# Patient Record
Sex: MALE | Race: WHITE | NOT HISPANIC OR LATINO | Employment: FULL TIME | URBAN - METROPOLITAN AREA
[De-identification: names, ages, dates, MRNs, and addresses within clinical notes are randomized per-mention and may not be internally consistent; named-entity substitution may affect disease eponyms.]

---

## 2019-12-12 ENCOUNTER — TELEPHONE (OUTPATIENT)
Dept: NEUROSURGERY | Facility: CLINIC | Age: 29
End: 2019-12-12

## 2020-02-19 ENCOUNTER — OFFICE VISIT (OUTPATIENT)
Dept: INTERNAL MEDICINE | Facility: CLINIC | Age: 30
End: 2020-02-19
Payer: COMMERCIAL

## 2020-02-19 VITALS
WEIGHT: 154.75 LBS | SYSTOLIC BLOOD PRESSURE: 107 MMHG | BODY MASS INDEX: 23.45 KG/M2 | HEART RATE: 83 BPM | HEIGHT: 68 IN | DIASTOLIC BLOOD PRESSURE: 67 MMHG | OXYGEN SATURATION: 100 %

## 2020-02-19 DIAGNOSIS — Z13.6 ENCOUNTER FOR LIPID SCREENING FOR CARDIOVASCULAR DISEASE: ICD-10-CM

## 2020-02-19 DIAGNOSIS — D22.9 MULTIPLE NEVI: ICD-10-CM

## 2020-02-19 DIAGNOSIS — Z13.220 ENCOUNTER FOR LIPID SCREENING FOR CARDIOVASCULAR DISEASE: ICD-10-CM

## 2020-02-19 DIAGNOSIS — K58.0 IRRITABLE BOWEL SYNDROME WITH DIARRHEA: ICD-10-CM

## 2020-02-19 DIAGNOSIS — Z23 FLU VACCINE NEED: ICD-10-CM

## 2020-02-19 DIAGNOSIS — Z11.4 SCREENING FOR HIV (HUMAN IMMUNODEFICIENCY VIRUS): ICD-10-CM

## 2020-02-19 DIAGNOSIS — Z13.1 ENCOUNTER FOR SCREENING FOR DIABETES MELLITUS: ICD-10-CM

## 2020-02-19 DIAGNOSIS — L82.1 SEBORRHEIC KERATOSES: ICD-10-CM

## 2020-02-19 DIAGNOSIS — Z00.00 ANNUAL PHYSICAL EXAM: Primary | ICD-10-CM

## 2020-02-19 DIAGNOSIS — G40.909 NONINTRACTABLE EPILEPSY WITHOUT STATUS EPILEPTICUS, UNSPECIFIED EPILEPSY TYPE: ICD-10-CM

## 2020-02-19 DIAGNOSIS — F41.9 ANXIETY: ICD-10-CM

## 2020-02-19 PROCEDURE — 99999 PR PBB SHADOW E&M-EST. PATIENT-LVL IV: ICD-10-PCS | Mod: PBBFAC,,, | Performed by: INTERNAL MEDICINE

## 2020-02-19 PROCEDURE — 99385 PR PREVENTIVE VISIT,NEW,18-39: ICD-10-PCS | Mod: S$GLB,,, | Performed by: INTERNAL MEDICINE

## 2020-02-19 PROCEDURE — 99999 PR PBB SHADOW E&M-EST. PATIENT-LVL IV: CPT | Mod: PBBFAC,,, | Performed by: INTERNAL MEDICINE

## 2020-02-19 PROCEDURE — 99385 PREV VISIT NEW AGE 18-39: CPT | Mod: S$GLB,,, | Performed by: INTERNAL MEDICINE

## 2020-02-19 RX ORDER — LEVETIRACETAM 500 MG/1
TABLET ORAL
COMMUNITY
Start: 2014-05-08 | End: 2020-02-19 | Stop reason: SDUPTHER

## 2020-02-19 RX ORDER — LEVETIRACETAM 500 MG/1
TABLET ORAL
Qty: 180 TABLET | Refills: 3 | Status: SHIPPED | OUTPATIENT
Start: 2020-02-19 | End: 2020-11-16

## 2020-02-19 NOTE — PROGRESS NOTES
Subjective:       Patient ID: Travis Bennett is a 29 y.o. male who  has a past medical history of Epilepsy.    Chief Complaint: Establish Care and Anxiety     History was obtained from the patient and supplemented through chart review.  There were no prior records to review.  -We are requesting records from [place] for vaccines, continuity of care.    Moved here from [place].  Grad school at [employer].  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    HPI    Epilepsy:  +FHx.  Since college.  Occurred during exam.  Tonic clonic.  Resulted in left shoulder dislocation.  Was unable to taper off AEDs d/t recurrence.  Last seizure 8/2017 after missing doses. Is on Keppra 500 qAM, 1000 QHS.  Feels a little foggy in the morning, minor memory issues (forgets words).    Anxiety:  Social anxiety.  Some trouble falling asleep.  Feels anxious/nervous.  Has difficulty being able to control worrying.  Reports racing thoughts, excessive worrying, overthinks it.  Has trouble relaxing; exercise helps.  Is fidgety, nail biting.  Is not easily annoyed.  Not Feels that something bad might happen.  Symptoms occur a few days a week.  Comes in waves.  Stress related.      IBS with diarrhea:  Upset stomach.  Since middle school. Related to anxiety.  No change with cutting out dairy, grain.    Multiple Nevi, seborrheic keratoses:  Has 1 cm SK at left flank.  Saw Derm and was reassured. No FHx skin cancer of first-degree relatives.  No h/o being easily sunburned. >20 nevi.    Exercise:  Strength training 4/week.  2-3/week here.  Walking more.  Goes to [employer].    Diet:  Cooks and buys packaged foods, eats out more than he should.      ETOH: 1-2/day. Not much during the week.    Review of Systems   Constitutional: Negative for activity change and unexpected weight change.   HENT: Negative for hearing loss, rhinorrhea and trouble swallowing.    Eyes: Negative for discharge and visual disturbance.   Respiratory: Negative for chest tightness and  wheezing.    Cardiovascular: Negative for chest pain and palpitations.   Gastrointestinal: Negative for blood in stool, constipation, diarrhea and vomiting.   Endocrine: Negative for polydipsia and polyuria.   Genitourinary: Negative for difficulty urinating, hematuria and urgency.   Musculoskeletal: Negative for arthralgias, joint swelling and neck pain.   Skin: Negative for rash and wound.   Neurological: Negative for weakness and headaches.   Hematological: Negative for adenopathy.   Psychiatric/Behavioral: Negative for confusion and dysphoric mood. The patient is nervous/anxious.          Past Medical History:   Diagnosis Date    Epilepsy      Past Surgical History:   Procedure Laterality Date    SHOULDER SURGERY Left 2012    d/t seizures     Family History   Problem Relation Age of Onset    Alcohol abuse Maternal Grandfather     Drug abuse Maternal Grandfather     Heart attack Maternal Grandfather          of heart attack at 58    Asthma Mother     Breast cancer Mother     Seizures Mother     Heart disease Paternal Grandfather         Obese, had heart attacks    Anxiety disorder Father     Colon cancer Neg Hx      Social History     Socioeconomic History    Marital status:      Spouse name: Not on file    Number of children: Not on file    Years of education: Not on file    Highest education level: Not on file   Occupational History    Not on file   Social Needs    Financial resource strain: Not very hard    Food insecurity:     Worry: Never true     Inability: Never true    Transportation needs:     Medical: No     Non-medical: No   Tobacco Use    Smoking status: Former Smoker     Packs/day: 0.25     Years: 0.00     Pack years: 0.00     Types: Cigarettes     Last attempt to quit: 2016     Years since quittin.1    Tobacco comment: Did smoke some in college but haven't in several years now   Substance and Sexual Activity    Alcohol use: Yes     Alcohol/week: 10.0 standard  "drinks     Types: 10 Cans of beer per week     Frequency: 2-3 times a week     Drinks per session: 3 or 4     Binge frequency: Less than monthly     Comment: Depends on the circumstances    Drug use: Not on file    Sexual activity: Not on file   Lifestyle    Physical activity:     Days per week: 3 days     Minutes per session: 60 min    Stress: Rather much   Relationships    Social connections:     Talks on phone: Once a week     Gets together: Once a week     Attends Catholic service: Not on file     Active member of club or organization: No     Attends meetings of clubs or organizations: Never     Relationship status: Living with partner   Other Topics Concern    Not on file   Social History Narrative    Not on file     Objective:      Vitals:    02/19/20 1308   BP: 107/67   Pulse: 83   SpO2: 100%   Weight: 70.2 kg (154 lb 12.2 oz)   Height: 5' 8" (1.727 m)      Physical Exam   Constitutional: He appears well-developed and well-nourished. No distress.   HENT:   Head: Normocephalic and atraumatic.   Nose: Nose normal.   Mouth/Throat: Oropharynx is clear and moist. No oropharyngeal exudate.   Eyes: Pupils are equal, round, and reactive to light. EOM are normal. Right eye exhibits no discharge. Left eye exhibits no discharge. No scleral icterus.   Neck: Neck supple. No tracheal deviation present. No thyromegaly present.   Cardiovascular: Normal rate, regular rhythm, normal heart sounds and intact distal pulses.   No murmur heard.  Pulmonary/Chest: Effort normal and breath sounds normal. No respiratory distress. He has no wheezes.   Abdominal: Soft. Bowel sounds are normal. He exhibits no distension. There is no tenderness.   Musculoskeletal: He exhibits no edema or deformity.   Lymphadenopathy:     He has no cervical adenopathy.   Neurological: He is alert. No cranial nerve deficit. Gait normal.   Skin: Skin is warm and dry. He is not diaphoretic. No erythema.        Psychiatric: He has a normal mood and " affect. His behavior is normal.         No results found for: WBC, HGB, HCT, PLT, CHOL, TRIG, HDL, LDLDIRECT, ALT, AST, NA, K, CL, CREATININE, BUN, CO2, TSH, PSA, INR, GLUF, HGBA1C, MICROALBUR    The ASCVD Risk score ([place] SHAKIRA Jr., et al., 2013) failed to calculate for the following reasons:    The 2013 ASCVD risk score is only valid for ages 40 to 79    (Imaging have been independently reviewed)  CXR without acute abnormality.    Assessment:       1. Annual physical exam    2. Nonintractable epilepsy without status epilepticus, unspecified epilepsy type    3. Anxiety    4. Irritable bowel syndrome with diarrhea    5. Multiple nevi    6. Seborrheic keratoses    7. Encounter for lipid screening for cardiovascular disease    8. Encounter for screening for diabetes mellitus    9. Screening for HIV (human immunodeficiency virus)    10. Flu vaccine need          Plan:       Travis was seen today for establish care and anxiety.    Diagnoses and all orders for this visit:    Annual physical exam  Comments:  Doing well with exercise.  Encouraged well-balanced diet.  Orders:  -     CBC auto differential; Future  -     Comprehensive metabolic panel; Future    Nonintractable epilepsy without status epilepticus, unspecified epilepsy type  Comments:  Has minor side effects, but otherwise doing well.  Continue Keppra 500 q.a.m., 1000 q.h.s..  Refer to Neurology.  Check TSH.  Orders:  -     Ambulatory referral/consult to Neurology; Future  -     TSH; Future  -     levETIRAcetam (KEPPRA) 500 MG Tab; 1 tablet every morning, 2 tablets every night.    Anxiety  Comments:  Varies. Refer to therapy. Advised to inquire of resources at his workplace.  Orders:  -     Ambulatory referral/consult to Psychology; Future    Irritable bowel syndrome with diarrhea  Comments:  Associated with anxiety.  As above.    Multiple nevi  Comments:  Refer to Derm for TBSE.  Orders:  -     Ambulatory referral/consult to Dermatology; Future    Seborrheic  keratoses  Comments:  Refer to Dermatology as above.    Encounter for lipid screening for cardiovascular disease  -     Lipid panel; Future    Encounter for screening for diabetes mellitus  -     Hemoglobin A1c; Future    Screening for HIV (human immunodeficiency virus)  -     HIV 1/2 Ag/Ab (4th Gen); Future    Flu vaccine need  Comments:  Advised to obtain vaccine at Pharmacy.         Side effects of medication(s) were discussed in detail and patient voiced understanding.  Patient will call back for any issues or complications.     RTC in 1 year(s) or sooner PRN.

## 2020-02-19 NOTE — PATIENT INSTRUCTIONS
Call to make an appointment within [facility] for psychiatry/psychology [phone]    Other psychiatrists:  Mary Epps(psychiatrist) [address] Phone: [phone]  Gage Pablo (psychiatrist) [phone], [phone]  [address] [facility]   Dr. Stephan Prieto - [phone]  Dr. Karely Matta - [phone]  Dr. Jenny Black - [phone]  Dr. Edy Shankar - [phone]    [facility]: [phone]    Therapy/Psychology:  You can try anyone of these number to see if your insurance is accepted or you would have to call your insurance.    [facility] (CBT) [facility]  Address: [facility] [place] [address]  Phone: [phone]  [email or web address]    [facility] [address], [address]  Phone: [phone]  You can email for an appointment at: [email or web address]    [facility]   [address] [place] [address] [address], [facility], [address]  [email or web address]  Dr. Asha Meade, [phone] or [email or web address]   Dr. Mine Ogden, [phone] or [email or web address]     Barbara Marti    LCSW (therapist) [phone]   [address] [place] [address]   Farideh King   LCSW (therapist) [phone]   [address] [facility]   Alycia Pedroza LCSW (therapist) [phone]   [address] [place] [address]   SAMANTHA Pablo         LCSW                    [phone]  Humza Willson [phone] (therapist) [address] [facility]  Donell King (therapist) [phone]  [address] Sheela Muñoz (therapist) [phone] [address] [facility]     [facility] Counseling [phone]  [facility][note 2][address] [facility] [place] [place] [address]    Employee Assistance Program (EAP)  Check through your employer's HR.    Online Therapist:    [email or web address]    Free Guided  Meditations  [email or web address]  [email or web address]  [email or web address]/[email or web address]/[email or web address]/pages/[email or web address].aspx

## 2020-03-12 ENCOUNTER — LAB VISIT (OUTPATIENT)
Dept: LAB | Facility: OTHER | Age: 30
End: 2020-03-12
Attending: INTERNAL MEDICINE
Payer: COMMERCIAL

## 2020-03-12 DIAGNOSIS — Z11.4 SCREENING FOR HIV (HUMAN IMMUNODEFICIENCY VIRUS): ICD-10-CM

## 2020-03-12 DIAGNOSIS — Z13.6 ENCOUNTER FOR LIPID SCREENING FOR CARDIOVASCULAR DISEASE: ICD-10-CM

## 2020-03-12 DIAGNOSIS — Z00.00 ANNUAL PHYSICAL EXAM: ICD-10-CM

## 2020-03-12 DIAGNOSIS — Z13.1 ENCOUNTER FOR SCREENING FOR DIABETES MELLITUS: ICD-10-CM

## 2020-03-12 DIAGNOSIS — G40.909 NONINTRACTABLE EPILEPSY WITHOUT STATUS EPILEPTICUS, UNSPECIFIED EPILEPSY TYPE: ICD-10-CM

## 2020-03-12 DIAGNOSIS — Z13.220 ENCOUNTER FOR LIPID SCREENING FOR CARDIOVASCULAR DISEASE: ICD-10-CM

## 2020-03-12 LAB
ALBUMIN SERPL BCP-MCNC: 4.7 G/DL (ref 3.5–5.2)
ALP SERPL-CCNC: 48 U/L (ref 55–135)
ALT SERPL W/O P-5'-P-CCNC: 11 U/L (ref 10–44)
ANION GAP SERPL CALC-SCNC: 12 MMOL/L (ref 8–16)
AST SERPL-CCNC: 19 U/L (ref 10–40)
BASOPHILS # BLD AUTO: 0.04 K/UL (ref 0–0.2)
BASOPHILS NFR BLD: 0.7 % (ref 0–1.9)
BILIRUB SERPL-MCNC: 0.5 MG/DL (ref 0.1–1)
BUN SERPL-MCNC: 13 MG/DL (ref 6–20)
CALCIUM SERPL-MCNC: 9.9 MG/DL (ref 8.7–10.5)
CHLORIDE SERPL-SCNC: 104 MMOL/L (ref 95–110)
CHOLEST SERPL-MCNC: 146 MG/DL (ref 120–199)
CHOLEST/HDLC SERPL: 2.6 {RATIO} (ref 2–5)
CO2 SERPL-SCNC: 26 MMOL/L (ref 23–29)
CREAT SERPL-MCNC: 1.2 MG/DL (ref 0.5–1.4)
DIFFERENTIAL METHOD: NORMAL
EOSINOPHIL # BLD AUTO: 0.1 K/UL (ref 0–0.5)
EOSINOPHIL NFR BLD: 1.4 % (ref 0–8)
ERYTHROCYTE [DISTWIDTH] IN BLOOD BY AUTOMATED COUNT: 12.7 % (ref 11.5–14.5)
EST. GFR  (AFRICAN AMERICAN): >60 ML/MIN/1.73 M^2
EST. GFR  (NON AFRICAN AMERICAN): >60 ML/MIN/1.73 M^2
ESTIMATED AVG GLUCOSE: 100 MG/DL (ref 68–131)
GLUCOSE SERPL-MCNC: 89 MG/DL (ref 70–110)
HBA1C MFR BLD HPLC: 5.1 % (ref 4–5.6)
HCT VFR BLD AUTO: 46.2 % (ref 40–54)
HDLC SERPL-MCNC: 57 MG/DL (ref 40–75)
HDLC SERPL: 39 % (ref 20–50)
HGB BLD-MCNC: 15.3 G/DL (ref 14–18)
IMM GRANULOCYTES # BLD AUTO: 0 K/UL (ref 0–0.04)
IMM GRANULOCYTES NFR BLD AUTO: 0 % (ref 0–0.5)
LDLC SERPL CALC-MCNC: 75 MG/DL (ref 63–159)
LYMPHOCYTES # BLD AUTO: 2.5 K/UL (ref 1–4.8)
LYMPHOCYTES NFR BLD: 42.5 % (ref 18–48)
MCH RBC QN AUTO: 30.1 PG (ref 27–31)
MCHC RBC AUTO-ENTMCNC: 33.1 G/DL (ref 32–36)
MCV RBC AUTO: 91 FL (ref 82–98)
MONOCYTES # BLD AUTO: 0.4 K/UL (ref 0.3–1)
MONOCYTES NFR BLD: 7 % (ref 4–15)
NEUTROPHILS # BLD AUTO: 2.8 K/UL (ref 1.8–7.7)
NEUTROPHILS NFR BLD: 48.4 % (ref 38–73)
NONHDLC SERPL-MCNC: 89 MG/DL
NRBC BLD-RTO: 0 /100 WBC
PLATELET # BLD AUTO: 276 K/UL (ref 150–350)
PMV BLD AUTO: 10.3 FL (ref 9.2–12.9)
POTASSIUM SERPL-SCNC: 4.1 MMOL/L (ref 3.5–5.1)
PROT SERPL-MCNC: 7.3 G/DL (ref 6–8.4)
RBC # BLD AUTO: 5.08 M/UL (ref 4.6–6.2)
SODIUM SERPL-SCNC: 142 MMOL/L (ref 136–145)
TRIGL SERPL-MCNC: 70 MG/DL (ref 30–150)
TSH SERPL DL<=0.005 MIU/L-ACNC: 1.22 UIU/ML (ref 0.4–4)
WBC # BLD AUTO: 5.84 K/UL (ref 3.9–12.7)

## 2020-03-12 PROCEDURE — 84443 ASSAY THYROID STIM HORMONE: CPT

## 2020-03-12 PROCEDURE — 80061 LIPID PANEL: CPT

## 2020-03-12 PROCEDURE — 85025 COMPLETE CBC W/AUTO DIFF WBC: CPT

## 2020-03-12 PROCEDURE — 83036 HEMOGLOBIN GLYCOSYLATED A1C: CPT

## 2020-03-12 PROCEDURE — 80053 COMPREHEN METABOLIC PANEL: CPT

## 2020-03-12 PROCEDURE — 86703 HIV-1/HIV-2 1 RESULT ANTBDY: CPT

## 2020-03-12 PROCEDURE — 36415 COLL VENOUS BLD VENIPUNCTURE: CPT

## 2020-03-13 ENCOUNTER — PATIENT MESSAGE (OUTPATIENT)
Dept: INTERNAL MEDICINE | Facility: CLINIC | Age: 30
End: 2020-03-13

## 2020-03-13 LAB — HIV 1+2 AB+HIV1 P24 AG SERPL QL IA: NEGATIVE

## 2020-11-16 DIAGNOSIS — G40.909 NONINTRACTABLE EPILEPSY WITHOUT STATUS EPILEPTICUS, UNSPECIFIED EPILEPSY TYPE: ICD-10-CM

## 2020-11-16 RX ORDER — LEVETIRACETAM 500 MG/1
TABLET ORAL
Qty: 90 TABLET | Refills: 7 | Status: SHIPPED | OUTPATIENT
Start: 2020-11-16 | End: 2021-05-31 | Stop reason: DRUGHIGH

## 2021-02-09 ENCOUNTER — OFFICE VISIT (OUTPATIENT)
Dept: INTERNAL MEDICINE | Facility: CLINIC | Age: 31
End: 2021-02-09
Payer: COMMERCIAL

## 2021-02-09 VITALS
HEIGHT: 68 IN | WEIGHT: 163.38 LBS | HEART RATE: 95 BPM | BODY MASS INDEX: 24.76 KG/M2 | SYSTOLIC BLOOD PRESSURE: 121 MMHG | OXYGEN SATURATION: 98 % | DIASTOLIC BLOOD PRESSURE: 71 MMHG

## 2021-02-09 DIAGNOSIS — D22.9 MULTIPLE NEVI: ICD-10-CM

## 2021-02-09 DIAGNOSIS — K58.0 IRRITABLE BOWEL SYNDROME WITH DIARRHEA: ICD-10-CM

## 2021-02-09 DIAGNOSIS — K62.5 BRBPR (BRIGHT RED BLOOD PER RECTUM): ICD-10-CM

## 2021-02-09 DIAGNOSIS — L82.1 SEBORRHEIC KERATOSES: ICD-10-CM

## 2021-02-09 DIAGNOSIS — Z00.00 ANNUAL PHYSICAL EXAM: Primary | ICD-10-CM

## 2021-02-09 PROCEDURE — 99999 PR PBB SHADOW E&M-EST. PATIENT-LVL IV: ICD-10-PCS | Mod: PBBFAC,,, | Performed by: INTERNAL MEDICINE

## 2021-02-09 PROCEDURE — 99395 PREV VISIT EST AGE 18-39: CPT | Mod: S$GLB,,, | Performed by: INTERNAL MEDICINE

## 2021-02-09 PROCEDURE — 1126F AMNT PAIN NOTED NONE PRSNT: CPT | Mod: S$GLB,,, | Performed by: INTERNAL MEDICINE

## 2021-02-09 PROCEDURE — 99999 PR PBB SHADOW E&M-EST. PATIENT-LVL IV: CPT | Mod: PBBFAC,,, | Performed by: INTERNAL MEDICINE

## 2021-02-09 PROCEDURE — 99395 PR PREVENTIVE VISIT,EST,18-39: ICD-10-PCS | Mod: S$GLB,,, | Performed by: INTERNAL MEDICINE

## 2021-02-09 PROCEDURE — 1126F PR PAIN SEVERITY QUANTIFIED, NO PAIN PRESENT: ICD-10-PCS | Mod: S$GLB,,, | Performed by: INTERNAL MEDICINE

## 2021-02-09 PROCEDURE — 3008F PR BODY MASS INDEX (BMI) DOCUMENTED: ICD-10-PCS | Mod: CPTII,S$GLB,, | Performed by: INTERNAL MEDICINE

## 2021-02-09 PROCEDURE — 3008F BODY MASS INDEX DOCD: CPT | Mod: CPTII,S$GLB,, | Performed by: INTERNAL MEDICINE

## 2021-04-16 ENCOUNTER — PATIENT MESSAGE (OUTPATIENT)
Dept: RESEARCH | Facility: HOSPITAL | Age: 31
End: 2021-04-16

## 2021-05-31 ENCOUNTER — OFFICE VISIT (OUTPATIENT)
Dept: NEUROLOGY | Facility: CLINIC | Age: 31
End: 2021-05-31
Payer: COMMERCIAL

## 2021-05-31 ENCOUNTER — PATIENT MESSAGE (OUTPATIENT)
Dept: NEUROLOGY | Facility: CLINIC | Age: 31
End: 2021-05-31

## 2021-05-31 DIAGNOSIS — F41.9 ANXIETY: ICD-10-CM

## 2021-05-31 DIAGNOSIS — G40.309 NONINTRACTABLE GENERALIZED IDIOPATHIC EPILEPSY WITHOUT STATUS EPILEPTICUS: Primary | ICD-10-CM

## 2021-05-31 DIAGNOSIS — G47.00 INSOMNIA, UNSPECIFIED TYPE: ICD-10-CM

## 2021-05-31 PROCEDURE — 99204 PR OFFICE/OUTPT VISIT, NEW, LEVL IV, 45-59 MIN: ICD-10-PCS | Mod: 95,,, | Performed by: PSYCHIATRY & NEUROLOGY

## 2021-05-31 PROCEDURE — 99204 OFFICE O/P NEW MOD 45 MIN: CPT | Mod: 95,,, | Performed by: PSYCHIATRY & NEUROLOGY

## 2021-05-31 RX ORDER — LEVETIRACETAM 750 MG/1
750 TABLET, EXTENDED RELEASE ORAL 2 TIMES DAILY
Qty: 180 TABLET | Refills: 3 | Status: SHIPPED | OUTPATIENT
Start: 2021-05-31 | End: 2022-06-13

## 2021-05-31 RX ORDER — LORATADINE 10 MG/1
10 TABLET ORAL DAILY PRN
COMMUNITY

## 2021-05-31 RX ORDER — TRAZODONE HYDROCHLORIDE 50 MG/1
50 TABLET ORAL NIGHTLY PRN
Qty: 30 TABLET | Refills: 11 | Status: SHIPPED | OUTPATIENT
Start: 2021-05-31 | End: 2021-09-20

## 2021-08-15 ENCOUNTER — PATIENT MESSAGE (OUTPATIENT)
Dept: INTERNAL MEDICINE | Facility: CLINIC | Age: 31
End: 2021-08-15

## 2021-08-15 DIAGNOSIS — M25.511 RIGHT SHOULDER PAIN, UNSPECIFIED CHRONICITY: Primary | ICD-10-CM

## 2021-08-27 DIAGNOSIS — R52 PAIN: Primary | ICD-10-CM

## 2021-09-04 ENCOUNTER — PATIENT MESSAGE (OUTPATIENT)
Dept: NEUROLOGY | Facility: CLINIC | Age: 31
End: 2021-09-04

## 2021-09-08 ENCOUNTER — PATIENT MESSAGE (OUTPATIENT)
Dept: INTERNAL MEDICINE | Facility: CLINIC | Age: 31
End: 2021-09-08

## 2021-09-17 ENCOUNTER — TELEPHONE (OUTPATIENT)
Dept: ORTHOPEDICS | Facility: CLINIC | Age: 31
End: 2021-09-17

## 2021-09-20 ENCOUNTER — HOSPITAL ENCOUNTER (OUTPATIENT)
Dept: RADIOLOGY | Facility: OTHER | Age: 31
Discharge: HOME | End: 2021-09-20
Attending: PHYSICIAN ASSISTANT
Payer: COMMERCIAL

## 2021-09-20 ENCOUNTER — OFFICE VISIT (OUTPATIENT)
Dept: ORTHOPEDICS | Facility: CLINIC | Age: 31
End: 2021-09-20
Attending: INTERNAL MEDICINE
Payer: COMMERCIAL

## 2021-09-20 VITALS
SYSTOLIC BLOOD PRESSURE: 110 MMHG | HEIGHT: 68 IN | BODY MASS INDEX: 24.71 KG/M2 | DIASTOLIC BLOOD PRESSURE: 75 MMHG | WEIGHT: 163 LBS | HEART RATE: 63 BPM

## 2021-09-20 DIAGNOSIS — R52 PAIN: ICD-10-CM

## 2021-09-20 DIAGNOSIS — M25.511 RIGHT SHOULDER PAIN, UNSPECIFIED CHRONICITY: ICD-10-CM

## 2021-09-20 PROCEDURE — 1159F PR MEDICATION LIST DOCUMENTED IN MEDICAL RECORD: ICD-10-PCS | Mod: CPTII,S$GLB,, | Performed by: PHYSICIAN ASSISTANT

## 2021-09-20 PROCEDURE — 99204 PR OFFICE/OUTPT VISIT, NEW, LEVL IV, 45-59 MIN: ICD-10-PCS | Mod: S$GLB,,, | Performed by: PHYSICIAN ASSISTANT

## 2021-09-20 PROCEDURE — 1159F MED LIST DOCD IN RCRD: CPT | Mod: CPTII,S$GLB,, | Performed by: PHYSICIAN ASSISTANT

## 2021-09-20 PROCEDURE — 3078F DIAST BP <80 MM HG: CPT | Mod: CPTII,S$GLB,, | Performed by: PHYSICIAN ASSISTANT

## 2021-09-20 PROCEDURE — 99999 PR PBB SHADOW E&M-EST. PATIENT-LVL III: CPT | Mod: PBBFAC,,, | Performed by: PHYSICIAN ASSISTANT

## 2021-09-20 PROCEDURE — 3074F PR MOST RECENT SYSTOLIC BLOOD PRESSURE < 130 MM HG: ICD-10-PCS | Mod: CPTII,S$GLB,, | Performed by: PHYSICIAN ASSISTANT

## 2021-09-20 PROCEDURE — 3078F PR MOST RECENT DIASTOLIC BLOOD PRESSURE < 80 MM HG: ICD-10-PCS | Mod: CPTII,S$GLB,, | Performed by: PHYSICIAN ASSISTANT

## 2021-09-20 PROCEDURE — 3008F BODY MASS INDEX DOCD: CPT | Mod: CPTII,S$GLB,, | Performed by: PHYSICIAN ASSISTANT

## 2021-09-20 PROCEDURE — 99999 PR PBB SHADOW E&M-EST. PATIENT-LVL III: ICD-10-PCS | Mod: PBBFAC,,, | Performed by: PHYSICIAN ASSISTANT

## 2021-09-20 PROCEDURE — 73030 X-RAY EXAM OF SHOULDER: CPT | Mod: 26,RT,, | Performed by: RADIOLOGY

## 2021-09-20 PROCEDURE — 73030 X-RAY EXAM OF SHOULDER: CPT | Mod: TC,FY,RT

## 2021-09-20 PROCEDURE — 1160F RVW MEDS BY RX/DR IN RCRD: CPT | Mod: CPTII,S$GLB,, | Performed by: PHYSICIAN ASSISTANT

## 2021-09-20 PROCEDURE — 73030 XR SHOULDER TRAUMA 3 VIEW RIGHT: ICD-10-PCS | Mod: 26,RT,, | Performed by: RADIOLOGY

## 2021-09-20 PROCEDURE — 99204 OFFICE O/P NEW MOD 45 MIN: CPT | Mod: S$GLB,,, | Performed by: PHYSICIAN ASSISTANT

## 2021-09-20 PROCEDURE — 1160F PR REVIEW ALL MEDS BY PRESCRIBER/CLIN PHARMACIST DOCUMENTED: ICD-10-PCS | Mod: CPTII,S$GLB,, | Performed by: PHYSICIAN ASSISTANT

## 2021-09-20 PROCEDURE — 3074F SYST BP LT 130 MM HG: CPT | Mod: CPTII,S$GLB,, | Performed by: PHYSICIAN ASSISTANT

## 2021-09-20 PROCEDURE — 3008F PR BODY MASS INDEX (BMI) DOCUMENTED: ICD-10-PCS | Mod: CPTII,S$GLB,, | Performed by: PHYSICIAN ASSISTANT

## 2021-09-22 ENCOUNTER — CLINICAL SUPPORT (OUTPATIENT)
Dept: REHABILITATION | Facility: OTHER | Age: 31
End: 2021-09-22
Payer: COMMERCIAL

## 2021-09-22 DIAGNOSIS — M25.511 RIGHT SHOULDER PAIN, UNSPECIFIED CHRONICITY: ICD-10-CM

## 2021-09-22 DIAGNOSIS — M25.311 SHOULDER INSTABILITY, RIGHT: ICD-10-CM

## 2021-09-22 DIAGNOSIS — M25.511 ACUTE PAIN OF RIGHT SHOULDER: ICD-10-CM

## 2021-09-22 PROCEDURE — 97110 THERAPEUTIC EXERCISES: CPT | Mod: PN | Performed by: PHYSICAL THERAPIST

## 2021-09-22 PROCEDURE — 97161 PT EVAL LOW COMPLEX 20 MIN: CPT | Mod: PN | Performed by: PHYSICAL THERAPIST

## 2021-09-23 PROBLEM — M25.311 SHOULDER INSTABILITY, RIGHT: Status: ACTIVE | Noted: 2021-09-23

## 2021-09-23 PROBLEM — M25.511 ACUTE PAIN OF RIGHT SHOULDER: Status: ACTIVE | Noted: 2021-09-23

## 2021-09-29 ENCOUNTER — CLINICAL SUPPORT (OUTPATIENT)
Dept: REHABILITATION | Facility: OTHER | Age: 31
End: 2021-09-29
Payer: COMMERCIAL

## 2021-09-29 DIAGNOSIS — M25.311 SHOULDER INSTABILITY, RIGHT: ICD-10-CM

## 2021-09-29 DIAGNOSIS — M25.511 ACUTE PAIN OF RIGHT SHOULDER: ICD-10-CM

## 2021-09-29 PROCEDURE — 97110 THERAPEUTIC EXERCISES: CPT | Mod: PN,CQ

## 2021-09-29 PROCEDURE — 97530 THERAPEUTIC ACTIVITIES: CPT | Mod: PN,CQ

## 2021-10-06 ENCOUNTER — CLINICAL SUPPORT (OUTPATIENT)
Dept: REHABILITATION | Facility: OTHER | Age: 31
End: 2021-10-06
Payer: COMMERCIAL

## 2021-10-06 DIAGNOSIS — M25.311 SHOULDER INSTABILITY, RIGHT: ICD-10-CM

## 2021-10-06 DIAGNOSIS — M25.511 ACUTE PAIN OF RIGHT SHOULDER: ICD-10-CM

## 2021-10-06 PROCEDURE — 97110 THERAPEUTIC EXERCISES: CPT | Mod: PN | Performed by: PHYSICAL THERAPIST

## 2021-10-06 PROCEDURE — 97140 MANUAL THERAPY 1/> REGIONS: CPT | Mod: PN | Performed by: PHYSICAL THERAPIST

## 2021-10-11 ENCOUNTER — CLINICAL SUPPORT (OUTPATIENT)
Dept: REHABILITATION | Facility: OTHER | Age: 31
End: 2021-10-11
Payer: COMMERCIAL

## 2021-10-11 DIAGNOSIS — M25.311 SHOULDER INSTABILITY, RIGHT: ICD-10-CM

## 2021-10-11 DIAGNOSIS — M25.511 ACUTE PAIN OF RIGHT SHOULDER: ICD-10-CM

## 2021-10-11 PROCEDURE — 97140 MANUAL THERAPY 1/> REGIONS: CPT | Mod: PN | Performed by: PHYSICAL THERAPIST

## 2021-10-11 PROCEDURE — 97110 THERAPEUTIC EXERCISES: CPT | Mod: PN | Performed by: PHYSICAL THERAPIST

## 2021-10-20 ENCOUNTER — CLINICAL SUPPORT (OUTPATIENT)
Dept: REHABILITATION | Facility: OTHER | Age: 31
End: 2021-10-20
Payer: COMMERCIAL

## 2021-10-20 DIAGNOSIS — M25.311 SHOULDER INSTABILITY, RIGHT: ICD-10-CM

## 2021-10-20 DIAGNOSIS — M25.511 ACUTE PAIN OF RIGHT SHOULDER: ICD-10-CM

## 2021-10-20 PROCEDURE — 97110 THERAPEUTIC EXERCISES: CPT | Mod: PN,CQ

## 2021-10-20 PROCEDURE — 97140 MANUAL THERAPY 1/> REGIONS: CPT | Mod: PN,CQ

## 2021-10-27 ENCOUNTER — CLINICAL SUPPORT (OUTPATIENT)
Dept: REHABILITATION | Facility: OTHER | Age: 31
End: 2021-10-27
Payer: COMMERCIAL

## 2021-10-27 DIAGNOSIS — M25.311 SHOULDER INSTABILITY, RIGHT: ICD-10-CM

## 2021-10-27 DIAGNOSIS — M25.511 ACUTE PAIN OF RIGHT SHOULDER: ICD-10-CM

## 2021-10-27 PROCEDURE — 97110 THERAPEUTIC EXERCISES: CPT | Mod: PN,CQ

## 2021-10-27 PROCEDURE — 97140 MANUAL THERAPY 1/> REGIONS: CPT | Mod: PN,CQ

## 2021-10-29 ENCOUNTER — TELEPHONE (OUTPATIENT)
Dept: ORTHOPEDICS | Facility: CLINIC | Age: 31
End: 2021-10-29
Payer: COMMERCIAL

## 2021-11-03 ENCOUNTER — CLINICAL SUPPORT (OUTPATIENT)
Dept: REHABILITATION | Facility: OTHER | Age: 31
End: 2021-11-03
Payer: COMMERCIAL

## 2021-11-03 DIAGNOSIS — M25.511 ACUTE PAIN OF RIGHT SHOULDER: ICD-10-CM

## 2021-11-03 DIAGNOSIS — M25.311 SHOULDER INSTABILITY, RIGHT: ICD-10-CM

## 2021-11-03 PROCEDURE — 97140 MANUAL THERAPY 1/> REGIONS: CPT | Mod: PN | Performed by: PHYSICAL THERAPIST

## 2021-11-03 PROCEDURE — 97110 THERAPEUTIC EXERCISES: CPT | Mod: PN | Performed by: PHYSICAL THERAPIST

## 2021-11-10 ENCOUNTER — CLINICAL SUPPORT (OUTPATIENT)
Dept: REHABILITATION | Facility: OTHER | Age: 31
End: 2021-11-10
Payer: COMMERCIAL

## 2021-11-10 DIAGNOSIS — M25.311 SHOULDER INSTABILITY, RIGHT: ICD-10-CM

## 2021-11-10 DIAGNOSIS — M25.511 ACUTE PAIN OF RIGHT SHOULDER: ICD-10-CM

## 2021-11-10 PROCEDURE — 97110 THERAPEUTIC EXERCISES: CPT | Mod: PN,CQ

## 2021-11-10 PROCEDURE — 97140 MANUAL THERAPY 1/> REGIONS: CPT | Mod: PN,CQ

## 2021-11-16 ENCOUNTER — PATIENT OUTREACH (OUTPATIENT)
Dept: ADMINISTRATIVE | Facility: OTHER | Age: 31
End: 2021-11-16
Payer: COMMERCIAL

## 2021-11-17 ENCOUNTER — OFFICE VISIT (OUTPATIENT)
Dept: ORTHOPEDICS | Facility: CLINIC | Age: 31
End: 2021-11-17
Attending: INTERNAL MEDICINE
Payer: COMMERCIAL

## 2021-11-17 ENCOUNTER — CLINICAL SUPPORT (OUTPATIENT)
Dept: REHABILITATION | Facility: OTHER | Age: 31
End: 2021-11-17
Payer: COMMERCIAL

## 2021-11-17 VITALS — WEIGHT: 163 LBS | BODY MASS INDEX: 24.71 KG/M2 | HEIGHT: 68 IN

## 2021-11-17 DIAGNOSIS — M25.511 ACUTE PAIN OF RIGHT SHOULDER: ICD-10-CM

## 2021-11-17 DIAGNOSIS — M25.511 RIGHT SHOULDER PAIN, UNSPECIFIED CHRONICITY: Primary | ICD-10-CM

## 2021-11-17 DIAGNOSIS — M25.311 SHOULDER INSTABILITY, RIGHT: ICD-10-CM

## 2021-11-17 PROCEDURE — 97140 MANUAL THERAPY 1/> REGIONS: CPT | Mod: PN | Performed by: PHYSICAL THERAPIST

## 2021-11-17 PROCEDURE — 99999 PR PBB SHADOW E&M-EST. PATIENT-LVL II: ICD-10-PCS | Mod: PBBFAC,,, | Performed by: PHYSICIAN ASSISTANT

## 2021-11-17 PROCEDURE — 97110 THERAPEUTIC EXERCISES: CPT | Mod: PN | Performed by: PHYSICAL THERAPIST

## 2021-11-17 PROCEDURE — 99213 PR OFFICE/OUTPT VISIT, EST, LEVL III, 20-29 MIN: ICD-10-PCS | Mod: S$GLB,,, | Performed by: PHYSICIAN ASSISTANT

## 2021-11-17 PROCEDURE — 3008F BODY MASS INDEX DOCD: CPT | Mod: CPTII,S$GLB,, | Performed by: PHYSICIAN ASSISTANT

## 2021-11-17 PROCEDURE — 99213 OFFICE O/P EST LOW 20 MIN: CPT | Mod: S$GLB,,, | Performed by: PHYSICIAN ASSISTANT

## 2021-11-17 PROCEDURE — 99999 PR PBB SHADOW E&M-EST. PATIENT-LVL II: CPT | Mod: PBBFAC,,, | Performed by: PHYSICIAN ASSISTANT

## 2021-11-17 PROCEDURE — 1160F RVW MEDS BY RX/DR IN RCRD: CPT | Mod: CPTII,S$GLB,, | Performed by: PHYSICIAN ASSISTANT

## 2021-11-17 PROCEDURE — 3008F PR BODY MASS INDEX (BMI) DOCUMENTED: ICD-10-PCS | Mod: CPTII,S$GLB,, | Performed by: PHYSICIAN ASSISTANT

## 2021-11-17 PROCEDURE — 1160F PR REVIEW ALL MEDS BY PRESCRIBER/CLIN PHARMACIST DOCUMENTED: ICD-10-PCS | Mod: CPTII,S$GLB,, | Performed by: PHYSICIAN ASSISTANT

## 2021-11-17 PROCEDURE — 1159F MED LIST DOCD IN RCRD: CPT | Mod: CPTII,S$GLB,, | Performed by: PHYSICIAN ASSISTANT

## 2021-11-17 PROCEDURE — 1159F PR MEDICATION LIST DOCUMENTED IN MEDICAL RECORD: ICD-10-PCS | Mod: CPTII,S$GLB,, | Performed by: PHYSICIAN ASSISTANT

## 2021-11-18 ENCOUNTER — TELEPHONE (OUTPATIENT)
Dept: SPORTS MEDICINE | Facility: CLINIC | Age: 31
End: 2021-11-18
Payer: COMMERCIAL

## 2021-12-03 ENCOUNTER — IMMUNIZATION (OUTPATIENT)
Dept: INTERNAL MEDICINE | Facility: CLINIC | Age: 31
End: 2021-12-03
Payer: COMMERCIAL

## 2021-12-03 DIAGNOSIS — Z23 NEED FOR VACCINATION: Primary | ICD-10-CM

## 2021-12-03 PROCEDURE — 0004A COVID-19, MRNA, LNP-S, PF, 30 MCG/0.3 ML DOSE VACCINE: CPT | Mod: PBBFAC | Performed by: INTERNAL MEDICINE

## 2022-06-13 DIAGNOSIS — G40.309 NONINTRACTABLE GENERALIZED IDIOPATHIC EPILEPSY WITHOUT STATUS EPILEPTICUS: ICD-10-CM

## 2022-06-13 RX ORDER — LEVETIRACETAM 750 MG/1
750 TABLET, EXTENDED RELEASE ORAL 2 TIMES DAILY
Qty: 180 TABLET | Refills: 3 | Status: SHIPPED | OUTPATIENT
Start: 2022-06-13 | End: 2023-06-12

## 2022-06-13 NOTE — TELEPHONE ENCOUNTER
Levetiracetam 750 mg extended release twice daily    Hilda Ruano MD PhD  Neurology-Epilepsy  [facility].

## 2022-07-08 ENCOUNTER — OFFICE VISIT (OUTPATIENT)
Dept: INTERNAL MEDICINE | Facility: CLINIC | Age: 32
End: 2022-07-08
Payer: COMMERCIAL

## 2022-07-08 VITALS
OXYGEN SATURATION: 99 % | WEIGHT: 161.38 LBS | DIASTOLIC BLOOD PRESSURE: 70 MMHG | SYSTOLIC BLOOD PRESSURE: 124 MMHG | BODY MASS INDEX: 24.46 KG/M2 | HEIGHT: 68 IN | HEART RATE: 83 BPM

## 2022-07-08 DIAGNOSIS — F41.1 GAD (GENERALIZED ANXIETY DISORDER): ICD-10-CM

## 2022-07-08 DIAGNOSIS — G47.00 INSOMNIA, UNSPECIFIED TYPE: ICD-10-CM

## 2022-07-08 DIAGNOSIS — G40.309 NONINTRACTABLE GENERALIZED IDIOPATHIC EPILEPSY WITHOUT STATUS EPILEPTICUS: ICD-10-CM

## 2022-07-08 DIAGNOSIS — F98.8 NAIL BITING: Primary | ICD-10-CM

## 2022-07-08 PROCEDURE — 99215 PR OFFICE/OUTPT VISIT, EST, LEVL V, 40-54 MIN: ICD-10-PCS | Mod: S$GLB,,, | Performed by: INTERNAL MEDICINE

## 2022-07-08 PROCEDURE — 1160F PR REVIEW ALL MEDS BY PRESCRIBER/CLIN PHARMACIST DOCUMENTED: ICD-10-PCS | Mod: CPTII,S$GLB,, | Performed by: INTERNAL MEDICINE

## 2022-07-08 PROCEDURE — 1160F RVW MEDS BY RX/DR IN RCRD: CPT | Mod: CPTII,S$GLB,, | Performed by: INTERNAL MEDICINE

## 2022-07-08 PROCEDURE — 3078F DIAST BP <80 MM HG: CPT | Mod: CPTII,S$GLB,, | Performed by: INTERNAL MEDICINE

## 2022-07-08 PROCEDURE — 1159F PR MEDICATION LIST DOCUMENTED IN MEDICAL RECORD: ICD-10-PCS | Mod: CPTII,S$GLB,, | Performed by: INTERNAL MEDICINE

## 2022-07-08 PROCEDURE — 3074F SYST BP LT 130 MM HG: CPT | Mod: CPTII,S$GLB,, | Performed by: INTERNAL MEDICINE

## 2022-07-08 PROCEDURE — 3008F PR BODY MASS INDEX (BMI) DOCUMENTED: ICD-10-PCS | Mod: CPTII,S$GLB,, | Performed by: INTERNAL MEDICINE

## 2022-07-08 PROCEDURE — 1159F MED LIST DOCD IN RCRD: CPT | Mod: CPTII,S$GLB,, | Performed by: INTERNAL MEDICINE

## 2022-07-08 PROCEDURE — 99215 OFFICE O/P EST HI 40 MIN: CPT | Mod: S$GLB,,, | Performed by: INTERNAL MEDICINE

## 2022-07-08 PROCEDURE — 99999 PR PBB SHADOW E&M-EST. PATIENT-LVL III: ICD-10-PCS | Mod: PBBFAC,,, | Performed by: INTERNAL MEDICINE

## 2022-07-08 PROCEDURE — 3074F PR MOST RECENT SYSTOLIC BLOOD PRESSURE < 130 MM HG: ICD-10-PCS | Mod: CPTII,S$GLB,, | Performed by: INTERNAL MEDICINE

## 2022-07-08 PROCEDURE — 3008F BODY MASS INDEX DOCD: CPT | Mod: CPTII,S$GLB,, | Performed by: INTERNAL MEDICINE

## 2022-07-08 PROCEDURE — 3078F PR MOST RECENT DIASTOLIC BLOOD PRESSURE < 80 MM HG: ICD-10-PCS | Mod: CPTII,S$GLB,, | Performed by: INTERNAL MEDICINE

## 2022-07-08 PROCEDURE — 99999 PR PBB SHADOW E&M-EST. PATIENT-LVL III: CPT | Mod: PBBFAC,,, | Performed by: INTERNAL MEDICINE

## 2022-07-08 RX ORDER — SERTRALINE HYDROCHLORIDE 25 MG/1
25 TABLET, FILM COATED ORAL DAILY
Qty: 30 TABLET | Refills: 11 | Status: SHIPPED | OUTPATIENT
Start: 2022-07-08 | End: 2022-08-01

## 2022-07-08 NOTE — PROGRESS NOTES
"  Subjective:       Patient ID: Travis Bennett is a 32 y.o. male who  has a past medical history of Epilepsy.    Chief Complaint: Nail Problem     History was obtained from the patient and supplemented through chart review.  Saw Ortho for R shoulder pain.    Moved here from [place].  Grad school at [employer].  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  His wife is Alyce Bennett. Their child is 1.5 years old. They are planning for another baby.     Nail Problem  Pertinent negatives include no rash.     Nail biting:  All his life.  Anxiety as below. Compulsion. No other repetitive activities. Tried nail polish with some relief.  Triggers: will unconsciously do this when lost in thought, work. Anxiety.   Has caused some teeth chipping.  Has some jaw pain. Wears a mouth guard. Eats ice.  No ice craving.    Anxiety:  Chronic. Started in middle school; moved a lot at the time. Social anxiety. Feels anxious/nervous.  Has difficulty being able to control worrying.  Reports racing thoughts, excessive worrying.  Feels that something bad might happen.  Symptoms occur nearly daily.    Went to therapy in college which was helpful.    Previous meds: Trazodone as below. Mom is on an unknown med. His wife is on Zoloft.    Exercise helps. He has tension HA, but not regularly.    Mood: "good"  Sleep:  Chronic, some improvement. Difficulty falling asleep. Trazodone as below. Sleep is not as big of an issue.  Anhedonia:  Usually focuses on 1 project at a time. Admits that he's neglected music.  Guilt: none  Energy: good  Concentration: good  Appetite: varies. Sometimes junk food.  Psychomotor agitation: nail biting     Insomnia:  As above. Neuro Rx trazodone p.r.n. for sleep. Caused spontaneous erection which caused trouble sleeping.    Epilepsy:  +FHx.  Since college.  Occurred during exam.  Tonic clonic.  Resulted in left shoulder dislocation.  Was unable to taper off AEDs d/t recurrence.  " "Last seizure 8/2017 after missing doses. Is on Keppra BID.            Not addressed today.  IBS with diarrhea:  Upset stomach prior to BM.  Since middle school. Related to anxiety.  No change with cutting out dairy, grain.  Varies. Associated with anxiety.     Multiple Nevi, seborrheic keratoses:  Has 1 cm SK at left flank.  Is unsure if it's changed.  Saw [facility] Derm and was reassured. No FHx skin cancer of first-degree relatives.  No h/o being easily sunburned. >20 nevi.  Referred to Derm for TBSE.    Right shoulder pain:  X-ray without acute abnormality.  Saw Ortho.  Recommended home exercises.  Symptoms improved.    Exercise:  Strength training 4/week, 40 minutes at a time.  Walking more.  Goes to [employer].    Diet:  Cooks and buys packaged foods, eats out more than he should.      ETOH:  2-3 times a week, 3-4 drinks at a time    Review of Systems   Constitutional: Negative for activity change and appetite change.   Respiratory: Negative for wheezing.    Cardiovascular: Negative for leg swelling.   Gastrointestinal: Positive for diarrhea.   Musculoskeletal: Negative for gait problem.   Skin: Negative for rash and wound.   Hematological: Negative for adenopathy.   Psychiatric/Behavioral: Positive for sleep disturbance. Negative for confusion and dysphoric mood. The patient is nervous/anxious.        I personally reviewed Past Medical History, Past Surgical History, Social History, and Family History.    Objective:      Vitals:    07/08/22 1343   BP: 124/70   Pulse: 83   SpO2: 99%   Weight: 73.2 kg (161 lb 6 oz)   Height: 5' 8" (1.727 m)      Physical Exam  Constitutional:       General: He is not in acute distress.     Appearance: He is well-developed. He is not diaphoretic.   HENT:      Head: Normocephalic and atraumatic.      Nose: Nose normal.      Mouth/Throat:      Pharynx: No oropharyngeal exudate.   Eyes:      General: No scleral icterus.        Right eye: No discharge.         Left eye: No " discharge.   Neck:      Thyroid: No thyromegaly.      Trachea: No tracheal deviation.   Cardiovascular:      Rate and Rhythm: Normal rate and regular rhythm.      Heart sounds: Normal heart sounds. No murmur heard.  Pulmonary:      Effort: Pulmonary effort is normal. No respiratory distress.      Breath sounds: Normal breath sounds. No wheezing.   Abdominal:      General: Bowel sounds are normal. There is no distension.      Palpations: Abdomen is soft.      Tenderness: There is no abdominal tenderness.   Musculoskeletal:         General: No deformity.      Cervical back: Neck supple.      Right lower leg: No edema.      Left lower leg: No edema.   Lymphadenopathy:      Cervical: No cervical adenopathy.   Skin:     General: Skin is warm and dry.      Findings: No erythema.   Neurological:      Mental Status: He is alert.      Gait: Gait normal.   Psychiatric:         Behavior: Behavior normal.           Lab Results   Component Value Date    WBC 5.84 03/12/2020    HGB 15.3 03/12/2020    HCT 46.2 03/12/2020     03/12/2020    CHOL 146 03/12/2020    TRIG 70 03/12/2020    HDL 57 03/12/2020    ALT 11 03/12/2020    AST 19 03/12/2020     03/12/2020    K 4.1 03/12/2020     03/12/2020    CREATININE 1.2 03/12/2020    BUN 13 03/12/2020    CO2 26 03/12/2020    TSH 1.224 03/12/2020    HGBA1C 5.1 03/12/2020       The ASCVD Risk score ([place] SHAKIRA Jr., et al., 2013) failed to calculate for the following reasons:    The 2013 ASCVD risk score is only valid for ages 40 to 79    (Imaging have been independently reviewed)  Shoulder x ray without acute abnormality.    Assessment:       1. Nail biting    2. MILEY (generalized anxiety disorder)    3. Insomnia, unspecified type    4. 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    Diagnoses and all orders for this visit:    Nail biting  Comments:  Persistent. Related to anxiety as below.  Also suspect OCD. Referred to therapy.  Orders:  -     Ambulatory referral/consult to Psychology; Future  -     sertraline (ZOLOFT) 25 MG tablet; Take 1 tablet (25 mg total) by mouth once daily.    MILEY (generalized anxiety disorder)  Comments:  Discussed tx options, SE. Start Zoloft. RTC for titration. Consider Paroxetine for sleep. He will inquire of his mom's med. Refer to therapy.  Orders:  -     Ambulatory referral/consult to Psychology; Future  -     sertraline (ZOLOFT) 25 MG tablet; Take 1 tablet (25 mg total) by mouth once daily.    Insomnia, unspecified type  Comments:  Mild. Avoid Trazodone. Could consider meds such as Paroxetine, Remeron, Amitriptyline if sleep is bothersome.    Nonintractable generalized idiopathic epilepsy without status epilepticus  Comments:  Controlled. Keppra BID. F/u with Neurology.          Side effects of medication(s) were discussed in detail and patient voiced understanding.  Patient will call back for any issues or complications.     I have spent a total of 55 minutes with the patient as well as reviewing the chart/medical record and placing orders on the day of the visit. Discussed mental health, nail biting.     RTC in 1 month(s) or sooner PRN for mental health. In person or virtual

## 2022-07-29 ENCOUNTER — PATIENT MESSAGE (OUTPATIENT)
Dept: INTERNAL MEDICINE | Facility: CLINIC | Age: 32
End: 2022-07-29
Payer: COMMERCIAL

## 2022-07-30 DIAGNOSIS — F41.1 GAD (GENERALIZED ANXIETY DISORDER): ICD-10-CM

## 2022-07-30 DIAGNOSIS — F98.8 NAIL BITING: ICD-10-CM

## 2022-07-30 NOTE — TELEPHONE ENCOUNTER
No new care gaps identified.  [facility] Embedded Care Gaps. Reference number: [id number]. 7/30/2022   11:33:07 AM ANIYAHT

## 2022-08-01 RX ORDER — SERTRALINE HYDROCHLORIDE 25 MG/1
TABLET, FILM COATED ORAL
Qty: 30 TABLET | Refills: 1 | Status: SHIPPED | OUTPATIENT
Start: 2022-08-01 | End: 2022-08-24 | Stop reason: SDUPTHER

## 2022-08-01 NOTE — TELEPHONE ENCOUNTER
Refill Routing Note   Medication(s) are not appropriate for processing by [facility] for the following reason(s):      - Medication is a new start (<3 months)    [facility] action(s):  Defer          Medication reconciliation completed: No     Appointments  past 12m or future 3m with PCP    Date Provider   Last Visit   7/8/2022 Farideh Dior MD   Next Visit   8/24/2022 Farideh Dior MD   ED visits in past 90 days: 0        Note composed:11:06 AM 08/01/2022

## 2022-08-03 ENCOUNTER — PATIENT MESSAGE (OUTPATIENT)
Dept: PSYCHIATRY | Facility: OTHER | Age: 32
End: 2022-08-03
Payer: COMMERCIAL

## 2022-08-15 ENCOUNTER — OFFICE VISIT (OUTPATIENT)
Dept: PSYCHIATRY | Facility: OTHER | Age: 32
End: 2022-08-15
Payer: COMMERCIAL

## 2022-08-15 DIAGNOSIS — F41.1 GAD (GENERALIZED ANXIETY DISORDER): ICD-10-CM

## 2022-08-15 DIAGNOSIS — F98.8 NAIL BITING: ICD-10-CM

## 2022-08-15 PROCEDURE — 90791 PSYCH DIAGNOSTIC EVALUATION: CPT | Mod: ,,, | Performed by: SOCIAL WORKER

## 2022-08-15 PROCEDURE — 90791 PR PSYCHIATRIC DIAGNOSTIC EVALUATION: ICD-10-PCS | Mod: ,,, | Performed by: SOCIAL WORKER
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
## 2022-08-24 ENCOUNTER — OFFICE VISIT (OUTPATIENT)
Dept: INTERNAL MEDICINE | Facility: CLINIC | Age: 32
End: 2022-08-24
Payer: COMMERCIAL

## 2022-08-24 DIAGNOSIS — G47.00 INSOMNIA, UNSPECIFIED TYPE: ICD-10-CM

## 2022-08-24 DIAGNOSIS — F41.1 GAD (GENERALIZED ANXIETY DISORDER): ICD-10-CM

## 2022-08-24 DIAGNOSIS — F98.8 NAIL BITING: Primary | ICD-10-CM

## 2022-08-24 PROCEDURE — 99214 PR OFFICE/OUTPT VISIT, EST, LEVL IV, 30-39 MIN: ICD-10-PCS | Mod: 95,,, | Performed by: INTERNAL MEDICINE

## 2022-08-24 PROCEDURE — 99214 OFFICE O/P EST MOD 30 MIN: CPT | Mod: 95,,, | Performed by: INTERNAL MEDICINE

## 2022-08-24 PROCEDURE — 1159F PR MEDICATION LIST DOCUMENTED IN MEDICAL RECORD: ICD-10-PCS | Mod: CPTII,95,, | Performed by: INTERNAL MEDICINE

## 2022-08-24 PROCEDURE — 1159F MED LIST DOCD IN RCRD: CPT | Mod: CPTII,95,, | Performed by: INTERNAL MEDICINE

## 2022-08-24 PROCEDURE — 1160F PR REVIEW ALL MEDS BY PRESCRIBER/CLIN PHARMACIST DOCUMENTED: ICD-10-PCS | Mod: CPTII,95,, | Performed by: INTERNAL MEDICINE

## 2022-08-24 PROCEDURE — 1160F RVW MEDS BY RX/DR IN RCRD: CPT | Mod: CPTII,95,, | Performed by: INTERNAL MEDICINE

## 2022-08-24 RX ORDER — SERTRALINE HYDROCHLORIDE 50 MG/1
50 TABLET, FILM COATED ORAL DAILY
Qty: 30 TABLET | Refills: 11 | Status: SHIPPED | OUTPATIENT
Start: 2022-08-24 | End: 2022-09-23

## 2022-08-24 NOTE — PATIENT INSTRUCTIONS
"Thank you for your message. We have been getting a lot of questions like yours.     [facility] is offering COVID-19 vaccinations in accordance to the recommendations of the [facility].  [facility] recommends scheduling your COVID Vaccine via [employer] by following the directions outlined below.      To Schedule your vaccine on the [employer] website:  Choose "Visits" then "Schedule an Appointment" and select the visit tile titled "COVID-19 Vaccine."    To Schedule your vaccine on the [employer] ted:  Choose "Appointments" then "Schedule an Appointment" then "Tell us why you're coming in" and select the visit tile titled "COVID Vaccine"      Patients may also call [phone] if they do not have a [facility] account.    More times and dates will become available as we receive more vaccine on a weekly basis. We encourage you to continue to check [facility] as more slots become available and appreciate your patience during this process.    Due to high activity, the [facility] website and COVID hotline may experience a slowdown or long wait times. We sincerely apologize for the inconvenience and appreciate your patience as you try and schedule your vaccination.    We are hopeful that the vaccine will be available to more members of the public soon. We encourage community members and patients to visit [email or web address] or ldh.[place].gov/[email or web address] or covidvaccine.[place].gov for the latest information and resources.     "

## 2022-08-24 NOTE — PROGRESS NOTES
The patient location is: [place]  The chief complaint leading to consultation is: Anxiety    Visit type: Virtual visit with synchronous audio and video  Contact time spent with patient: 15 minutes  Each patient to whom he or she provides medical services by telemedicine is:  (1) informed of the relationship between the physician and patient and the respective role of any other health care provider with respect to management of the patient; and (2) notified that he or she may decline to receive medical services by telemedicine and may withdraw from such care at any time.  Subjective:       Patient ID: Travis Bennett is a 32 y.o. male who  has a past medical history of Epilepsy.    Chief Complaint: Anxiety     History was obtained from the patient and supplemented through chart review.  Following c therapy for MILEY.    Moved here from [place].  Grad school at [employer].  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  His wife is Alyce Bennett. Their child is 1.5 years old. They are planning for another baby.     HPI     Nail biting:  All his life.  Anxiety as below. Compulsion. No other repetitive activities. Tried nail polish with some relief.  Triggers: will unconsciously do this when lost in thought, work. Anxiety.   Has caused some teeth chipping.  Has some jaw pain. Wears a mouth guard. Eats ice.  No ice craving.  Just started seeing [facility] therapy, which was a good meeting.    MILEY:  Chronic. Started in middle school; moved a lot at the time. Social anxiety. Feels anxious/nervous.  Has difficulty being able to control worrying.  Reports racing thoughts, excessive worrying.  Feels that something bad might happen.  Symptoms occur nearly daily.    Previous meds: Trazodone as below. Mom is on Paxil. His wife is on Zoloft.    Exercise helps. He has tension HA, but not regularly.    Started Zoloft. Initially with fatigue when taking it qAM; resolved c qHS dosing. Denies side effects,  "such as GI upset, insomnia, sedation, or impaired sexual function.    Usually feels tension; feels that baseline anxiety has improved.  Sometimes has a panic attack while driving d/t h/o seizure as below. Feels nervous while driving since he has a baby.     Went to therapy in college which was helpful. Restarted therapy at [facility].    Mood: "good"  Sleep:  Chronic, some improvement. Difficulty falling asleep. Trazodone as below. Sleep is not as big of an issue.  Anhedonia:  Usually focuses on 1 project at a time. Admits that he's neglected music.  Guilt: none  Energy: good  Concentration: good  Appetite: varies. Sometimes junk food.  Psychomotor agitation: nail biting     Insomnia:  As above. Neuro Rx trazodone p.r.n. for sleep. Caused spontaneous erection which caused trouble sleeping.            Not addressed today.  IBS with diarrhea:  Upset stomach prior to BM.  Since middle school. Related to anxiety.  No change with cutting out dairy, grain.  Varies. Associated with anxiety.     Multiple Nevi, seborrheic keratoses:  Has 1 cm SK at left flank.  Is unsure if it's changed.  Saw [facility] Derm and was reassured. No FHx skin cancer of first-degree relatives.  No h/o being easily sunburned. >20 nevi.  Referred to Derm for TBSE.    Right shoulder pain:  X-ray without acute abnormality.  Saw Ortho.  Recommended home exercises.  Symptoms improved.    Epilepsy:  +FHx.  Since college.  Occurred during exam.  Tonic clonic.  Resulted in left shoulder dislocation.  Was unable to taper off AEDs d/t recurrence.  Last seizure 8/2017 after missing doses. Is on Keppra BID.  Controlled. Keppra BID. F/u with Neurology.     Exercise:  Strength training 4/week, 40 minutes at a time.  Walking more.  Goes to [employer].    Diet:  Cooks and buys packaged foods, eats out more than he should.      ETOH:  2-3 times a week, 3-4 drinks at a time    Review of Systems   Constitutional: Negative for activity change, fatigue and unexpected " weight change.   HENT: Negative for hearing loss, rhinorrhea and trouble swallowing.    Eyes: Negative for discharge and visual disturbance.   Respiratory: Negative for chest tightness and wheezing.    Cardiovascular: Negative for chest pain and palpitations.   Gastrointestinal: Negative for blood in stool, constipation, diarrhea and vomiting.   Endocrine: Negative for polydipsia and polyuria.   Genitourinary: Negative for difficulty urinating, hematuria and urgency.   Musculoskeletal: Negative for arthralgias, joint swelling and neck pain.   Neurological: Negative for weakness and headaches.   Psychiatric/Behavioral: Negative for confusion, dysphoric mood and sleep disturbance. The patient is nervous/anxious.        I personally reviewed Past Medical History, Past Surgical History, Social History, and Family History.    Objective:      There were no vitals filed for this visit.   Physical Exam  Constitutional:       General: He is not in acute distress.     Appearance: He is well-developed. He is not diaphoretic.   HENT:      Head: Normocephalic and atraumatic.   Eyes:      General:         Right eye: No discharge.         Left eye: No discharge.   Pulmonary:      Effort: Pulmonary effort is normal. No respiratory distress.   Skin:     Coloration: Skin is not pale.      Findings: No erythema.   Neurological:      Mental Status: He is alert.   Psychiatric:         Behavior: Behavior normal.           Lab Results   Component Value Date    WBC 5.84 03/12/2020    HGB 15.3 03/12/2020    HCT 46.2 03/12/2020     03/12/2020    CHOL 146 03/12/2020    TRIG 70 03/12/2020    HDL 57 03/12/2020    ALT 11 03/12/2020    AST 19 03/12/2020     03/12/2020    K 4.1 03/12/2020     03/12/2020    CREATININE 1.2 03/12/2020    BUN 13 03/12/2020    CO2 26 03/12/2020    TSH 1.224 03/12/2020    HGBA1C 5.1 03/12/2020       The ASCVD Risk score (Peyton SHAKIRA Jr., et al., 2013) failed to calculate for the following reasons:    The  2013 ASCVD risk score is only valid for ages 40 to 79    (Imaging have been independently reviewed)  Shoulder x ray without acute abnormality.    Assessment:       1. Nail biting    2. MILEY (generalized anxiety disorder)    3. Insomnia, unspecified type          Plan:       Travis was seen today for anxiety.    Diagnoses and all orders for this visit:    Nail biting  Comments:  Persistent. Related to anxiety as below. Also suspect OCD. Referred to therapy.  Orders:  -     sertraline (ZOLOFT) 50 MG tablet; Take 1 tablet (50 mg total) by mouth once daily.    MILEY (generalized anxiety disorder)  Comments:  Some improvement. Increase Zoloft to 50; may titrate to 100 after 1 mo. Consider Paroxetine d/t sleep, FHx.   Cont therapy.  Orders:  -     sertraline (ZOLOFT) 50 MG tablet; Take 1 tablet (50 mg total) by mouth once daily.    Insomnia, unspecified type  Comments:  Mild. Avoid Trazodone. Could consider meds such as Paroxetine, Remeron, Amitriptyline if sleep is bothersome.         Side effects of medication(s) were discussed in detail and patient voiced understanding.  Patient will call back for any issues or complications.     RTC in 1 month(s) or sooner PRN for mental health. In person or virtual. Ok to cancel if not needed.  RTC in 3 month(s) or sooner PRN for mental health, annual. In person.

## 2022-08-29 ENCOUNTER — OFFICE VISIT (OUTPATIENT)
Dept: PSYCHIATRY | Facility: OTHER | Age: 32
End: 2022-08-29
Payer: COMMERCIAL

## 2022-08-29 DIAGNOSIS — F41.9 ANXIETY: Primary | ICD-10-CM

## 2022-08-29 PROCEDURE — 90837 PR PSYCHOTHERAPY W/PATIENT, 60 MIN: ICD-10-PCS | Mod: ,,, | Performed by: SOCIAL WORKER

## 2022-08-29 PROCEDURE — 90837 PSYTX W PT 60 MINUTES: CPT | Mod: ,,, | Performed by: SOCIAL WORKER

## 2022-08-29 NOTE — PROGRESS NOTES
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 Some social anxiety, running the lunch seminar, "beginning of semester is always overwhelming". Wife had first OB appt, he is working from home if he can as wife is experiencing a lot of fatigue. 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 Notes social anxiety has not been helped at all by medication. Met with PCP, upped Zoloft from 25mg to 50mg last week. Feels like social anxiety is worse in the AM, related to GI issues. Pt notes the times he feels like he is drinking too much is when he feels like he's balancing too many things at one time.   Notes that he nakia with social anxiety by being avoidant, "that's a short term solution, I don't want to be an avoidant person". Notes he tries to be open in class about feeling anxiety, discussed "imposter syndrome" and how this impacts him in conversations with people who are above him. Talked about pressure on him growing up to get good grades and be a good student. Discussed coping mechanisms for anxiety, will f/u in 3 weeks.      Therapeutic techniques and approaches: behavior modification and supportive counseling. Rationale: appropriate for presenting issues.     Pt denies SI/HI. Mood was euthymic, affect matches verbal content. AAOx3, participated fully in session.     Time spent in counselinmins       "

## 2022-11-17 ENCOUNTER — PATIENT MESSAGE (OUTPATIENT)
Dept: INTERNAL MEDICINE | Facility: CLINIC | Age: 32
End: 2022-11-17
Payer: COMMERCIAL

## 2022-12-14 ENCOUNTER — OFFICE VISIT (OUTPATIENT)
Dept: INTERNAL MEDICINE | Facility: CLINIC | Age: 32
End: 2022-12-14
Payer: COMMERCIAL

## 2022-12-14 ENCOUNTER — TELEPHONE (OUTPATIENT)
Dept: INTERNAL MEDICINE | Facility: CLINIC | Age: 32
End: 2022-12-14

## 2022-12-14 DIAGNOSIS — F98.8 NAIL BITING: Primary | ICD-10-CM

## 2022-12-14 DIAGNOSIS — F41.1 GAD (GENERALIZED ANXIETY DISORDER): ICD-10-CM

## 2022-12-14 PROCEDURE — 99214 OFFICE O/P EST MOD 30 MIN: CPT | Mod: 95,,, | Performed by: INTERNAL MEDICINE

## 2022-12-14 PROCEDURE — 1159F PR MEDICATION LIST DOCUMENTED IN MEDICAL RECORD: ICD-10-PCS | Mod: CPTII,95,, | Performed by: INTERNAL MEDICINE

## 2022-12-14 PROCEDURE — 1160F PR REVIEW ALL MEDS BY PRESCRIBER/CLIN PHARMACIST DOCUMENTED: ICD-10-PCS | Mod: CPTII,95,, | Performed by: INTERNAL MEDICINE

## 2022-12-14 PROCEDURE — 1160F RVW MEDS BY RX/DR IN RCRD: CPT | Mod: CPTII,95,, | Performed by: INTERNAL MEDICINE

## 2022-12-14 PROCEDURE — 99214 PR OFFICE/OUTPT VISIT, EST, LEVL IV, 30-39 MIN: ICD-10-PCS | Mod: 95,,, | Performed by: INTERNAL MEDICINE

## 2022-12-14 PROCEDURE — 1159F MED LIST DOCD IN RCRD: CPT | Mod: CPTII,95,, | Performed by: INTERNAL MEDICINE

## 2022-12-14 RX ORDER — SERTRALINE HYDROCHLORIDE 100 MG/1
100 TABLET, FILM COATED ORAL DAILY
Qty: 30 TABLET | Refills: 11 | Status: SHIPPED | OUTPATIENT
Start: 2022-12-14 | End: 2023-03-22 | Stop reason: SDUPTHER

## 2022-12-14 NOTE — TELEPHONE ENCOUNTER
----- Message from Farideh Dior MD sent at 12/14/2022  1:09 PM CST -----  1. Please schedule visit in 2 months for anxiety, annual. In person   2. Ordered referral to Psychiatry.  Please provide the patient with phone numbers to call and schedule. Thank you!

## 2022-12-14 NOTE — PROGRESS NOTES
The patient location is: [place]  The chief complaint leading to consultation is: Anxiety    Visit type: Virtual visit with synchronous audio and video  Contact time spent with patient: 15 minutes  Each patient to whom he or she provides medical services by telemedicine is:  (1) informed of the relationship between the physician and patient and the respective role of any other health care provider with respect to management of the patient; and (2) notified that he or she may decline to receive medical services by telemedicine and may withdraw from such care at any time.  Subjective:       Patient ID: Travis Bennett is a 32 y.o. male who  has a past medical history of Epilepsy.    Chief Complaint: Anxiety     History was obtained from the patient and supplemented through chart review.  Following c therapy for MILEY.    Moved here from [place].  Grad school at [employer].  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  He is classically trained in piano, sings, electronic music.  His wife is Alyce Bennett. Their child is nearly 2 years old. They are planning for another baby.     HPI     Nail biting, OCD:  All his life.  Anxiety as below. Compulsion. No other repetitive activities. Tried nail polish with some relief.  Triggers: will unconsciously do this when lost in thought, work. Anxiety.   Has caused some teeth chipping.  Has some jaw pain. Wears a mouth guard. Eats ice.  No ice craving.    Was seeing [facility] therapy, which has been helpful. Going to therapy at [employer] about 1/mo now d/t cost. He is inquiring about Psychiatry d/t persistent OCD.    MILEY:  Chronic. Started in middle school; moved a lot at the time. Social anxiety. Feels anxious/nervous.  Has difficulty being able to control worrying.  Reports racing thoughts, excessive worrying.  Feels that something bad might happen.  Symptoms occur nearly daily.  Was having a panic attack while driving d/t h/o seizure as below. Feels nervous while driving since he has a baby.     Previous meds:  Trazodone as below. Mom is on Paxil. His wife is on Zoloft.    Exercise helps. He has tension HA, but not regularly.    On Zoloft increased to 50. Helping with baseline anxiety, but still c OCD. No panic attacks.               Not addressed today.  IBS with diarrhea:  Upset stomach prior to BM.  Since middle school. Related to anxiety.  No change with cutting out dairy, grain.  Varies. Associated with anxiety.     Multiple Nevi, seborrheic keratoses:  Has 1 cm SK at left flank.  Is unsure if it's changed.  Saw [facility] and was reassured. No FHx skin cancer of first-degree relatives.  No h/o being easily sunburned. >20 nevi.  Referred to Derm for TBSE.    Epilepsy:  +FHx.  Since college.  Occurred during an exam.  Tonic clonic.  Resulted in left shoulder dislocation.  Was unable to taper off AEDs d/t recurrence.  Last seizure 8/2017 d/t missed doses. Is on Keppra BID.  Controlled. Keppra BID. F/u with Neurology.     Insomnia:  As above. Neuro Rx'd trazodone p.r.n. for sleep. Caused spontaneous erection which caused trouble sleeping.  Mild. Avoid Trazodone. Could consider meds such as Paroxetine, Remeron, Amitriptyline if sleep is bothersome.    Exercise:  Strength training 4/week, 40 minutes at a time.  Walking more.  Goes to [employer].    Diet:  Cooks and buys packaged foods, eats out more than he should.      ETOH:  2-3 times a week, 3-4 drinks at a time    Review of Systems   Constitutional:  Negative for activity change and unexpected weight change.   HENT:  Negative for hearing loss, rhinorrhea and trouble swallowing.    Eyes:  Negative for discharge and visual disturbance.   Respiratory:  Negative for chest tightness and wheezing.    Cardiovascular:  Negative for chest pain and palpitations.   Gastrointestinal:  Negative for blood in stool, constipation, diarrhea and vomiting.   Endocrine: Negative for polydipsia and polyuria.   Genitourinary:  Negative for difficulty urinating, hematuria and urgency.    Musculoskeletal:  Negative for arthralgias, joint swelling and neck pain.   Neurological:  Negative for weakness and headaches.   Psychiatric/Behavioral:  Negative for confusion and dysphoric mood.        I personally reviewed Past Medical History, Past Surgical History, Social History, and Family History.    Objective:      There were no vitals filed for this visit.   Physical Exam  Constitutional:       General: He is not in acute distress.     Appearance: He is well-developed. He is not diaphoretic.   HENT:      Head: Normocephalic and atraumatic.   Eyes:      General:         Right eye: No discharge.         Left eye: No discharge.   Pulmonary:      Effort: Pulmonary effort is normal. No respiratory distress.   Skin:     Coloration: Skin is not pale.      Findings: No erythema.   Neurological:      Mental Status: He is alert.   Psychiatric:         Behavior: Behavior normal.         Lab Results   Component Value Date    WBC 5.84 03/12/2020    HGB 15.3 03/12/2020    HCT 46.2 03/12/2020     03/12/2020    CHOL 146 03/12/2020    TRIG 70 03/12/2020    HDL 57 03/12/2020    ALT 11 03/12/2020    AST 19 03/12/2020     03/12/2020    K 4.1 03/12/2020     03/12/2020    CREATININE 1.2 03/12/2020    BUN 13 03/12/2020    CO2 26 03/12/2020    TSH 1.224 03/12/2020    HGBA1C 5.1 03/12/2020       The ASCVD Risk score (Adarsh LAW, et al., 2019) failed to calculate for the following reasons:    The 2019 ASCVD risk score is only valid for ages 40 to 79    (Imaging have been independently reviewed)  Shoulder x ray without acute abnormality.    Assessment:       1. Nail biting    2. MILEY (generalized anxiety disorder)            Plan:       Travis was seen today for anxiety.    Diagnoses and all orders for this visit:    Nail biting  Comments:  Persistent. Related to anxiety as below. Titrate SSRI. Refer to Psychiatry. Cont therapy.  Orders:  -     sertraline (ZOLOFT) 100 MG tablet; Take 1 tablet (100 mg total) by  mouth once daily. May increase by 25 to 50 mg at intervals every 1 to 2 weeks.  -     Ambulatory referral/consult to Psychiatry; Future    MILEY (generalized anxiety disorder)  Comments:  Improved, but still with significant OCD. Increase Zoloft to 100; may titrate by 25-50 mg after about 1-2 wks. Cont therapy.  Orders:  -     sertraline (ZOLOFT) 100 MG tablet; Take 1 tablet (100 mg total) by mouth once daily. May increase by 25 to 50 mg at intervals every 1 to 2 weeks.  -     Ambulatory referral/consult to Psychiatry; Future           Side effects of medication(s) were discussed in detail and patient voiced understanding.  Patient will call back for any issues or complications.     RTC in 2-3 month(s) or sooner PRN for mental health, annual. In person.

## 2023-03-22 ENCOUNTER — LAB VISIT (OUTPATIENT)
Dept: LAB | Facility: OTHER | Age: 33
End: 2023-03-22
Attending: INTERNAL MEDICINE
Payer: COMMERCIAL

## 2023-03-22 ENCOUNTER — OFFICE VISIT (OUTPATIENT)
Dept: INTERNAL MEDICINE | Facility: CLINIC | Age: 33
End: 2023-03-22
Payer: COMMERCIAL

## 2023-03-22 VITALS
OXYGEN SATURATION: 100 % | HEART RATE: 66 BPM | BODY MASS INDEX: 25.13 KG/M2 | HEIGHT: 68 IN | WEIGHT: 165.81 LBS | SYSTOLIC BLOOD PRESSURE: 112 MMHG | DIASTOLIC BLOOD PRESSURE: 70 MMHG

## 2023-03-22 DIAGNOSIS — Z00.00 ANNUAL PHYSICAL EXAM: ICD-10-CM

## 2023-03-22 DIAGNOSIS — Z00.00 ANNUAL PHYSICAL EXAM: Primary | ICD-10-CM

## 2023-03-22 DIAGNOSIS — F98.8 NAIL BITING: ICD-10-CM

## 2023-03-22 DIAGNOSIS — G40.309 NONINTRACTABLE GENERALIZED IDIOPATHIC EPILEPSY WITHOUT STATUS EPILEPTICUS: ICD-10-CM

## 2023-03-22 DIAGNOSIS — F41.1 GAD (GENERALIZED ANXIETY DISORDER): ICD-10-CM

## 2023-03-22 DIAGNOSIS — D22.9 MULTIPLE NEVI: ICD-10-CM

## 2023-03-22 LAB
ALBUMIN SERPL BCP-MCNC: 4.6 G/DL (ref 3.5–5.2)
ALP SERPL-CCNC: 51 U/L (ref 55–135)
ALT SERPL W/O P-5'-P-CCNC: 10 U/L (ref 10–44)
ANION GAP SERPL CALC-SCNC: 9 MMOL/L (ref 8–16)
AST SERPL-CCNC: 22 U/L (ref 10–40)
BASOPHILS # BLD AUTO: 0.04 K/UL (ref 0–0.2)
BASOPHILS NFR BLD: 0.6 % (ref 0–1.9)
BILIRUB SERPL-MCNC: 0.6 MG/DL (ref 0.1–1)
BUN SERPL-MCNC: 21 MG/DL (ref 6–20)
CALCIUM SERPL-MCNC: 9.7 MG/DL (ref 8.7–10.5)
CHLORIDE SERPL-SCNC: 104 MMOL/L (ref 95–110)
CO2 SERPL-SCNC: 25 MMOL/L (ref 23–29)
CREAT SERPL-MCNC: 1.2 MG/DL (ref 0.5–1.4)
DIFFERENTIAL METHOD: NORMAL
EOSINOPHIL # BLD AUTO: 0.1 K/UL (ref 0–0.5)
EOSINOPHIL NFR BLD: 0.8 % (ref 0–8)
ERYTHROCYTE [DISTWIDTH] IN BLOOD BY AUTOMATED COUNT: 12.5 % (ref 11.5–14.5)
EST. GFR  (NO RACE VARIABLE): >60 ML/MIN/1.73 M^2
GLUCOSE SERPL-MCNC: 90 MG/DL (ref 70–110)
HCT VFR BLD AUTO: 46.5 % (ref 40–54)
HGB BLD-MCNC: 15.5 G/DL (ref 14–18)
IMM GRANULOCYTES # BLD AUTO: 0.01 K/UL (ref 0–0.04)
IMM GRANULOCYTES NFR BLD AUTO: 0.2 % (ref 0–0.5)
LYMPHOCYTES # BLD AUTO: 2 K/UL (ref 1–4.8)
LYMPHOCYTES NFR BLD: 32.1 % (ref 18–48)
MCH RBC QN AUTO: 30.3 PG (ref 27–31)
MCHC RBC AUTO-ENTMCNC: 33.3 G/DL (ref 32–36)
MCV RBC AUTO: 91 FL (ref 82–98)
MONOCYTES # BLD AUTO: 0.4 K/UL (ref 0.3–1)
MONOCYTES NFR BLD: 6.9 % (ref 4–15)
NEUTROPHILS # BLD AUTO: 3.7 K/UL (ref 1.8–7.7)
NEUTROPHILS NFR BLD: 59.4 % (ref 38–73)
NRBC BLD-RTO: 0 /100 WBC
PLATELET # BLD AUTO: 254 K/UL (ref 150–450)
PMV BLD AUTO: 9.6 FL (ref 9.2–12.9)
POTASSIUM SERPL-SCNC: 4.3 MMOL/L (ref 3.5–5.1)
PROT SERPL-MCNC: 7.5 G/DL (ref 6–8.4)
RBC # BLD AUTO: 5.12 M/UL (ref 4.6–6.2)
SODIUM SERPL-SCNC: 138 MMOL/L (ref 136–145)
WBC # BLD AUTO: 6.19 K/UL (ref 3.9–12.7)

## 2023-03-22 PROCEDURE — 3008F BODY MASS INDEX DOCD: CPT | Mod: CPTII,S$GLB,, | Performed by: INTERNAL MEDICINE

## 2023-03-22 PROCEDURE — 1159F PR MEDICATION LIST DOCUMENTED IN MEDICAL RECORD: ICD-10-PCS | Mod: CPTII,S$GLB,, | Performed by: INTERNAL MEDICINE

## 2023-03-22 PROCEDURE — 1160F RVW MEDS BY RX/DR IN RCRD: CPT | Mod: CPTII,S$GLB,, | Performed by: INTERNAL MEDICINE

## 2023-03-22 PROCEDURE — 3074F SYST BP LT 130 MM HG: CPT | Mod: CPTII,S$GLB,, | Performed by: INTERNAL MEDICINE

## 2023-03-22 PROCEDURE — 99395 PR PREVENTIVE VISIT,EST,18-39: ICD-10-PCS | Mod: S$GLB,,, | Performed by: INTERNAL MEDICINE

## 2023-03-22 PROCEDURE — 3074F PR MOST RECENT SYSTOLIC BLOOD PRESSURE < 130 MM HG: ICD-10-PCS | Mod: CPTII,S$GLB,, | Performed by: INTERNAL MEDICINE

## 2023-03-22 PROCEDURE — 1159F MED LIST DOCD IN RCRD: CPT | Mod: CPTII,S$GLB,, | Performed by: INTERNAL MEDICINE

## 2023-03-22 PROCEDURE — 3078F DIAST BP <80 MM HG: CPT | Mod: CPTII,S$GLB,, | Performed by: INTERNAL MEDICINE

## 2023-03-22 PROCEDURE — 3008F PR BODY MASS INDEX (BMI) DOCUMENTED: ICD-10-PCS | Mod: CPTII,S$GLB,, | Performed by: INTERNAL MEDICINE

## 2023-03-22 PROCEDURE — 99395 PREV VISIT EST AGE 18-39: CPT | Mod: S$GLB,,, | Performed by: INTERNAL MEDICINE

## 2023-03-22 PROCEDURE — 36415 COLL VENOUS BLD VENIPUNCTURE: CPT | Performed by: INTERNAL MEDICINE

## 2023-03-22 PROCEDURE — 1160F PR REVIEW ALL MEDS BY PRESCRIBER/CLIN PHARMACIST DOCUMENTED: ICD-10-PCS | Mod: CPTII,S$GLB,, | Performed by: INTERNAL MEDICINE

## 2023-03-22 PROCEDURE — 99999 PR PBB SHADOW E&M-EST. PATIENT-LVL III: ICD-10-PCS | Mod: PBBFAC,,, | Performed by: INTERNAL MEDICINE

## 2023-03-22 PROCEDURE — 85025 COMPLETE CBC W/AUTO DIFF WBC: CPT | Performed by: INTERNAL MEDICINE

## 2023-03-22 PROCEDURE — 80053 COMPREHEN METABOLIC PANEL: CPT | Performed by: INTERNAL MEDICINE

## 2023-03-22 PROCEDURE — 3078F PR MOST RECENT DIASTOLIC BLOOD PRESSURE < 80 MM HG: ICD-10-PCS | Mod: CPTII,S$GLB,, | Performed by: INTERNAL MEDICINE

## 2023-03-22 PROCEDURE — 99999 PR PBB SHADOW E&M-EST. PATIENT-LVL III: CPT | Mod: PBBFAC,,, | Performed by: INTERNAL MEDICINE

## 2023-03-22 RX ORDER — SERTRALINE HYDROCHLORIDE 50 MG/1
50 TABLET, FILM COATED ORAL DAILY
Qty: 90 TABLET | Refills: 3 | Status: SHIPPED | OUTPATIENT
Start: 2023-03-22 | End: 2023-06-14 | Stop reason: SDUPTHER

## 2023-03-22 NOTE — PROGRESS NOTES
Subjective:       Patient ID: Travis Bennett is a 32 y.o. male who  has a past medical history of Epilepsy.    Chief Complaint: Annual Exam     History was obtained from the patient and supplemented through chart review.  There were no ER or clinic visits since our last appointment.    Moved here from [place].  Grad school at [employer].  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  His wife is Alyce Bennett. Their daughter is 2 years old. His wife is pregnant; expecting a baby boy next week!    HPI     His wife is pregnant.  He is inquiring of Tdap vaccine.  He received Tdap in 2018.    Nail biting, OCD:  All his life.  Anxiety as below. Compulsion. No other repetitive activities. Tried nail polish with some relief.  Triggers: will unconsciously do this when lost in thought, work. Anxiety.   Has caused some teeth chipping.  Has some jaw pain. Wears a mouth guard. Eats ice.  No ice craving.    Therapy has been helpful. Seeing Dr. Chairez and therapy at [employer] through his workplace d/t cost.     MILEY:  Chronic. Started in middle school; moved a lot at the time. Social anxiety.  Exercise helps.   Following c therapy as above.     Previous meds: Trazodone as below.   Mom is on Paxil. His wife is on Zoloft.    Zoloft helps with anxiety. Increased Zoloft to 100 mg s much benefit. Initially had decreased libdo with Zoloft 25 and 50 that improved.  Decreased libido more pronounced now with 100mg, so he prefers to return to 50mg.     Multiple Nevi, seborrheic keratoses:  No FHx skin cancer of first-degree relatives.  No h/o being easily sunburned. >20 nevi.  Follows [facility].    Epilepsy:  +FHx.  Since college.  Occurred during an exam.  Tonic clonic.  Resulted in left shoulder dislocation.  Was unable to taper off AEDs d/t recurrence.  Last seizure 8/2017 d/t missed doses. Is on Keppra BID.    Exercise:  Takes walks with his daughter to the park. Takes turns with his wife. 3/week,  "30 minutes at a time.      ETOH:  Decreased.  1-2 drinks at a time, 2 to 3 times a week.            Not addressed today.  IBS with diarrhea:  Upset stomach prior to BM.  Since middle school. Related to anxiety.  No change with cutting out dairy, grain.  Varies. Associated with anxiety.     Insomnia:  As above. Neuro Rx'd trazodone p.r.n. for sleep. Caused spontaneous erection which caused trouble sleeping.  Mild. Avoid Trazodone. Could consider meds such as Paroxetine, Remeron, Amitriptyline if sleep is bothersome.    Review of Systems   Constitutional:  Negative for activity change and appetite change.   Respiratory:  Negative for shortness of breath and wheezing.    Cardiovascular:  Negative for chest pain and leg swelling.   Gastrointestinal:  Negative for constipation and diarrhea.   Genitourinary:  Negative for dysuria.   Musculoskeletal:  Negative for gait problem.   Skin:  Negative for rash and wound.   Neurological:  Negative for dizziness and light-headedness.   Hematological:  Negative for adenopathy.   Psychiatric/Behavioral:  Negative for confusion. The patient is not nervous/anxious.        I personally reviewed Past Medical History, Past Surgical History, Social History, and Family History.    Objective:      Vitals:    03/22/23 1034   BP: 112/70   Pulse: 66   SpO2: 100%   Weight: 75.2 kg (165 lb 12.6 oz)   Height: 5' 8" (1.727 m)      Physical Exam  Constitutional:       General: He is not in acute distress.     Appearance: He is well-developed. He is not diaphoretic.   HENT:      Head: Normocephalic and atraumatic.      Nose: Nose normal.      Mouth/Throat:      Pharynx: No oropharyngeal exudate.   Eyes:      General: No scleral icterus.        Right eye: No discharge.         Left eye: No discharge.   Neck:      Thyroid: No thyromegaly.      Trachea: No tracheal deviation.   Cardiovascular:      Rate and Rhythm: Normal rate and regular rhythm.      Heart sounds: Normal heart sounds. No murmur " heard.  Pulmonary:      Effort: Pulmonary effort is normal. No respiratory distress.      Breath sounds: Normal breath sounds. No wheezing.   Abdominal:      General: Bowel sounds are normal. There is no distension.      Palpations: Abdomen is soft.      Tenderness: There is no abdominal tenderness.   Musculoskeletal:         General: No deformity.      Cervical back: Neck supple.      Right lower leg: No edema.      Left lower leg: No edema.   Lymphadenopathy:      Cervical: No cervical adenopathy.   Skin:     General: Skin is warm and dry.      Findings: No erythema.   Neurological:      Mental Status: He is alert.      Gait: Gait normal.   Psychiatric:         Behavior: Behavior normal.         Lab Results   Component Value Date    WBC 6.19 03/22/2023    HGB 15.5 03/22/2023    HCT 46.5 03/22/2023     03/22/2023    CHOL 146 03/12/2020    TRIG 70 03/12/2020    HDL 57 03/12/2020    ALT 10 03/22/2023    AST 22 03/22/2023     03/22/2023    K 4.3 03/22/2023     03/22/2023    CREATININE 1.2 03/22/2023    BUN 21 (H) 03/22/2023    CO2 25 03/22/2023    TSH 1.224 03/12/2020    HGBA1C 5.1 03/12/2020       The ASCVD Risk score (Norfolk DK, et al., 2019) failed to calculate for the following reasons:    The 2019 ASCVD risk score is only valid for ages 40 to 79    (Imaging have been independently reviewed)  Shoulder x ray without acute abnormality.    Assessment:       1. Annual physical exam    2. Nail biting    3. MILEY (generalized anxiety disorder)    4. Multiple nevi    5. Nonintractable generalized idiopathic epilepsy without status epilepticus              Plan:       Travis was seen today for annual exam.    Diagnoses and all orders for this visit:    Annual physical exam  Comments:  Doing well. Update routine labs.  Orders:  -     CBC Auto Differential; Future  -     Comprehensive Metabolic Panel; Future    Nail biting  Comments:  Persistent. Related to anxiety as below. Cont SSRI, therapy.    Orders:  -     sertraline (ZOLOFT) 50 MG tablet; Take 1 tablet (50 mg total) by mouth once daily.    MILEY (generalized anxiety disorder)  Comments:  Controlled, but decrease Zoloft to 50 d/t decreased libido. Monitor sx; may taper more slowly alternating doses qOD if needed. Cont therapy.  Orders:  -     sertraline (ZOLOFT) 50 MG tablet; Take 1 tablet (50 mg total) by mouth once daily.    Multiple nevi  Comments:  He will schedule c [facility] for TBSE.    Nonintractable generalized idiopathic epilepsy without status epilepticus  Comments:  Controlled. Cont Keppra BID. F/u with Neurology.          Side effects of medication(s) were discussed in detail and patient voiced understanding.  Patient will call back for any issues or complications.     Health Maintenance   Topic Date Due    TETANUS VACCINE  01/01/2028    Lipid Panel  Completed    Hepatitis C Screening  Discontinued     RTC in 1 year(s) or sooner PRN for annual.

## 2023-06-12 DIAGNOSIS — G40.309 NONINTRACTABLE GENERALIZED IDIOPATHIC EPILEPSY WITHOUT STATUS EPILEPTICUS: ICD-10-CM

## 2023-06-12 RX ORDER — LEVETIRACETAM 750 MG/1
750 TABLET, EXTENDED RELEASE ORAL 2 TIMES DAILY
Qty: 180 TABLET | Refills: 3 | Status: SHIPPED | OUTPATIENT
Start: 2023-06-12

## 2023-06-12 NOTE — TELEPHONE ENCOUNTER
Levetiracetam 750 XR twice daily    Hilda Ruano MD PhD [facility]  Neurology-Epilepsy  [facility].

## 2023-06-14 DIAGNOSIS — F41.1 GAD (GENERALIZED ANXIETY DISORDER): ICD-10-CM

## 2023-06-14 DIAGNOSIS — F98.8 NAIL BITING: ICD-10-CM

## 2023-06-14 RX ORDER — SERTRALINE HYDROCHLORIDE 50 MG/1
50 TABLET, FILM COATED ORAL DAILY
Qty: 90 TABLET | Refills: 0 | Status: SHIPPED | OUTPATIENT
Start: 2023-06-14 | End: 2023-10-06

## 2023-06-14 NOTE — TELEPHONE ENCOUNTER
No care due was identified.  [facility] Embedded Care Due Messages. Reference number: [id number].   6/14/2023 12:33:30 PM CDT

## 2023-06-26 ENCOUNTER — PATIENT MESSAGE (OUTPATIENT)
Dept: INTERNAL MEDICINE | Facility: CLINIC | Age: 33
End: 2023-06-26
Payer: COMMERCIAL

## 2023-10-05 DIAGNOSIS — F98.8 NAIL BITING: ICD-10-CM

## 2023-10-05 DIAGNOSIS — F41.1 GAD (GENERALIZED ANXIETY DISORDER): ICD-10-CM

## 2023-10-06 RX ORDER — SERTRALINE HYDROCHLORIDE 50 MG/1
50 TABLET, FILM COATED ORAL
Qty: 90 TABLET | Refills: 0 | Status: SHIPPED | OUTPATIENT
Start: 2023-10-06 | End: 2024-01-24 | Stop reason: SDUPTHER

## 2023-10-06 NOTE — TELEPHONE ENCOUNTER
Provider Staff:  Action required for this patient     Please see care gap opportunities below in Care Due Message: Appointment to establish care required due to primary provider retiring from [facility]. .     Thanks!  [facility]     Appointments     Last Visit   Visit date not found Radha Snyder MD   Next Visit   Visit date not found Radha Snyder MD     Refill Decision Note   Travis Bennett  is requesting a refill authorization.  Brief Assessment and Rationale for Refill:  Route       Medication Therapy Plan:  LCO 6/28/23, pt seeking to establish care with a new PCP. Routing to last prescribing physician; ROUTE        Extended chart review required: Yes    Comments:     Note composed:5:08 AM 10/06/2023

## 2024-01-23 ENCOUNTER — TELEPHONE (OUTPATIENT)
Dept: FAMILY MEDICINE | Facility: CLINIC | Age: 34
End: 2024-01-23
Payer: COMMERCIAL

## 2024-01-24 ENCOUNTER — OFFICE VISIT (OUTPATIENT)
Dept: FAMILY MEDICINE | Facility: CLINIC | Age: 34
End: 2024-01-24
Payer: COMMERCIAL

## 2024-01-24 ENCOUNTER — LAB VISIT (OUTPATIENT)
Dept: LAB | Facility: HOSPITAL | Age: 34
End: 2024-01-24
Attending: INTERNAL MEDICINE
Payer: COMMERCIAL

## 2024-01-24 VITALS
HEART RATE: 64 BPM | OXYGEN SATURATION: 97 % | WEIGHT: 167.31 LBS | DIASTOLIC BLOOD PRESSURE: 78 MMHG | TEMPERATURE: 98 F | BODY MASS INDEX: 25.44 KG/M2 | SYSTOLIC BLOOD PRESSURE: 108 MMHG

## 2024-01-24 DIAGNOSIS — G40.309 NONINTRACTABLE GENERALIZED IDIOPATHIC EPILEPSY WITHOUT STATUS EPILEPTICUS: ICD-10-CM

## 2024-01-24 DIAGNOSIS — F41.1 GAD (GENERALIZED ANXIETY DISORDER): ICD-10-CM

## 2024-01-24 DIAGNOSIS — Z00.00 ANNUAL PHYSICAL EXAM: ICD-10-CM

## 2024-01-24 DIAGNOSIS — Z00.00 ANNUAL PHYSICAL EXAM: Primary | ICD-10-CM

## 2024-01-24 DIAGNOSIS — K58.0 IRRITABLE BOWEL SYNDROME WITH DIARRHEA: ICD-10-CM

## 2024-01-24 DIAGNOSIS — F98.8 NAIL BITING: ICD-10-CM

## 2024-01-24 PROBLEM — M25.511 ACUTE PAIN OF RIGHT SHOULDER: Status: RESOLVED | Noted: 2021-09-23 | Resolved: 2024-01-24

## 2024-01-24 PROBLEM — M25.311 SHOULDER INSTABILITY, RIGHT: Status: RESOLVED | Noted: 2021-09-23 | Resolved: 2024-01-24

## 2024-01-24 LAB
ALBUMIN SERPL BCP-MCNC: 4.5 G/DL (ref 3.5–5.2)
ALP SERPL-CCNC: 51 U/L (ref 55–135)
ALT SERPL W/O P-5'-P-CCNC: 14 U/L (ref 10–44)
ANION GAP SERPL CALC-SCNC: 8 MMOL/L (ref 8–16)
AST SERPL-CCNC: 25 U/L (ref 10–40)
BASOPHILS # BLD AUTO: 0.03 K/UL (ref 0–0.2)
BASOPHILS NFR BLD: 0.5 % (ref 0–1.9)
BILIRUB SERPL-MCNC: 0.4 MG/DL (ref 0.1–1)
BUN SERPL-MCNC: 15 MG/DL (ref 6–20)
CALCIUM SERPL-MCNC: 10.5 MG/DL (ref 8.7–10.5)
CHLORIDE SERPL-SCNC: 104 MMOL/L (ref 95–110)
CHOLEST SERPL-MCNC: 113 MG/DL (ref 120–199)
CHOLEST/HDLC SERPL: 3.1 {RATIO} (ref 2–5)
CO2 SERPL-SCNC: 27 MMOL/L (ref 23–29)
CREAT SERPL-MCNC: 1.1 MG/DL (ref 0.5–1.4)
DIFFERENTIAL METHOD BLD: NORMAL
EOSINOPHIL # BLD AUTO: 0.1 K/UL (ref 0–0.5)
EOSINOPHIL NFR BLD: 2 % (ref 0–8)
ERYTHROCYTE [DISTWIDTH] IN BLOOD BY AUTOMATED COUNT: 12.8 % (ref 11.5–14.5)
EST. GFR  (NO RACE VARIABLE): >60 ML/MIN/1.73 M^2
ESTIMATED AVG GLUCOSE: 97 MG/DL (ref 68–131)
GLUCOSE SERPL-MCNC: 87 MG/DL (ref 70–110)
HBA1C MFR BLD: 5 % (ref 4–5.6)
HCT VFR BLD AUTO: 47 % (ref 40–54)
HDLC SERPL-MCNC: 37 MG/DL (ref 40–75)
HDLC SERPL: 32.7 % (ref 20–50)
HGB BLD-MCNC: 15.3 G/DL (ref 14–18)
IMM GRANULOCYTES # BLD AUTO: 0.02 K/UL (ref 0–0.04)
IMM GRANULOCYTES NFR BLD AUTO: 0.3 % (ref 0–0.5)
LDLC SERPL CALC-MCNC: 63.6 MG/DL (ref 63–159)
LYMPHOCYTES # BLD AUTO: 2.1 K/UL (ref 1–4.8)
LYMPHOCYTES NFR BLD: 34.8 % (ref 18–48)
MCH RBC QN AUTO: 29.3 PG (ref 27–31)
MCHC RBC AUTO-ENTMCNC: 32.6 G/DL (ref 32–36)
MCV RBC AUTO: 90 FL (ref 82–98)
MONOCYTES # BLD AUTO: 0.4 K/UL (ref 0.3–1)
MONOCYTES NFR BLD: 6.8 % (ref 4–15)
NEUTROPHILS # BLD AUTO: 3.4 K/UL (ref 1.8–7.7)
NEUTROPHILS NFR BLD: 55.6 % (ref 38–73)
NONHDLC SERPL-MCNC: 76 MG/DL
NRBC BLD-RTO: 0 /100 WBC
PLATELET # BLD AUTO: 268 K/UL (ref 150–450)
PMV BLD AUTO: 9.9 FL (ref 9.2–12.9)
POTASSIUM SERPL-SCNC: 4.8 MMOL/L (ref 3.5–5.1)
PROT SERPL-MCNC: 7.5 G/DL (ref 6–8.4)
RBC # BLD AUTO: 5.22 M/UL (ref 4.6–6.2)
SODIUM SERPL-SCNC: 139 MMOL/L (ref 136–145)
TRIGL SERPL-MCNC: 62 MG/DL (ref 30–150)
WBC # BLD AUTO: 6.15 K/UL (ref 3.9–12.7)

## 2024-01-24 PROCEDURE — 36415 COLL VENOUS BLD VENIPUNCTURE: CPT | Mod: PO | Performed by: INTERNAL MEDICINE

## 2024-01-24 PROCEDURE — 99999 PR PBB SHADOW E&M-EST. PATIENT-LVL III: CPT | Mod: PBBFAC,,, | Performed by: INTERNAL MEDICINE

## 2024-01-24 PROCEDURE — 85025 COMPLETE CBC W/AUTO DIFF WBC: CPT | Performed by: INTERNAL MEDICINE

## 2024-01-24 PROCEDURE — 80061 LIPID PANEL: CPT | Performed by: INTERNAL MEDICINE

## 2024-01-24 PROCEDURE — 83036 HEMOGLOBIN GLYCOSYLATED A1C: CPT | Performed by: INTERNAL MEDICINE

## 2024-01-24 PROCEDURE — 99214 OFFICE O/P EST MOD 30 MIN: CPT | Mod: S$GLB,,, | Performed by: INTERNAL MEDICINE

## 2024-01-24 PROCEDURE — 3008F BODY MASS INDEX DOCD: CPT | Mod: CPTII,S$GLB,, | Performed by: INTERNAL MEDICINE

## 2024-01-24 PROCEDURE — 3074F SYST BP LT 130 MM HG: CPT | Mod: CPTII,S$GLB,, | Performed by: INTERNAL MEDICINE

## 2024-01-24 PROCEDURE — 1159F MED LIST DOCD IN RCRD: CPT | Mod: CPTII,S$GLB,, | Performed by: INTERNAL MEDICINE

## 2024-01-24 PROCEDURE — 3078F DIAST BP <80 MM HG: CPT | Mod: CPTII,S$GLB,, | Performed by: INTERNAL MEDICINE

## 2024-01-24 PROCEDURE — 80053 COMPREHEN METABOLIC PANEL: CPT | Performed by: INTERNAL MEDICINE

## 2024-01-24 PROCEDURE — 1160F RVW MEDS BY RX/DR IN RCRD: CPT | Mod: CPTII,S$GLB,, | Performed by: INTERNAL MEDICINE

## 2024-01-24 RX ORDER — SERTRALINE HYDROCHLORIDE 50 MG/1
50 TABLET, FILM COATED ORAL DAILY
Qty: 90 TABLET | Refills: 3 | Status: SHIPPED | OUTPATIENT
Start: 2024-01-24

## 2024-01-24 NOTE — PROGRESS NOTES
Chief Complaint: Establish Care and Annual Exam      Travis Bennett  is a 33 y.o. year old patient who presents to clinic today to establish as a new patient.     Epilepsy, no seizures for many years. Last in 2017 because he misse his Keppra dose. Stable on Keppra     Anxiety and IBS and OCD spectrum, has been on Zoloft since . Goes for therapy which helps. 100mg zoloft caused restless leg.  IBS is stable. Usually in the morning. Diarrhea predominant.     Social hx: Patient does not smoke cigarettes or vape. Patient does drink alcoholic beverages. Has been trying intermittent fasting. Patient's diet is improving. Patient does not exercise as much. Has two children. 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      Past Surgical History:   Procedure Laterality Date    SHOULDER SURGERY Left     d/t seizures        Family History   Problem Relation Age of Onset    Asthma Mother     Breast cancer Mother     Seizures Mother     Anxiety disorder Father     Alcohol abuse Maternal Grandfather     Drug abuse Maternal Grandfather     Heart attack Maternal Grandfather          of heart attack at 58    Heart disease Paternal Grandfather         Obese, had heart attacks    Colon cancer Neg Hx     Prostate cancer Neg Hx         Social History     Socioeconomic History    Marital status:    Tobacco Use    Smoking status: Former     Current packs/day: 0.00     Types: Cigarettes     Start date: 2016     Quit date: 2016     Years since quittin.0    Tobacco comments:     Did smoke some in college but haven't in several years now   Substance and Sexual Activity    Alcohol use: Yes     Alcohol/week: 10.0 standard drinks of alcohol     Types: 10 Cans of beer per week     Comment: Depends on the circumstances    Drug use: Not Currently     Types: Marijuana    Sexual activity: Yes     Partners: Female     Birth control/protection: Condom, I.U.D.     Social Determinants of Health     Financial  Resource Strain: Low Risk  (1/19/2024)    Overall Financial Resource Strain (CARDIA)     Difficulty of Paying Living Expenses: Not very hard   Food Insecurity: No Food Insecurity (1/19/2024)    Hunger Vital Sign     Worried About Running Out of Food in the Last Year: Never true     Ran Out of Food in the Last Year: Never true   Transportation Needs: No Transportation Needs (1/19/2024)    PRAPARE - Transportation     Lack of Transportation (Medical): No     Lack of Transportation (Non-Medical): No   Physical Activity: Insufficiently Active (1/19/2024)    Exercise Vital Sign     Days of Exercise per Week: 3 days     Minutes of Exercise per Session: 30 min   Stress: Stress Concern Present (1/19/2024)    [employer] - Occupational Stress Questionnaire     Feeling of Stress : To some extent   Social Connections: Unknown (1/19/2024)    Social Connection and Isolation Panel [NHANES]     Frequency of Communication with Friends and Family: Twice a week     Frequency of Social Gatherings with Friends and Family: Once a week     Active Member of Clubs or Organizations: No     Attends Club or Organization Meetings: Patient declined     Marital Status:    Housing Stability: Low Risk  (1/19/2024)    Housing Stability Vital Sign     Unable to Pay for Housing in the Last Year: No     Number of Places Lived in the Last Year: 1     Unstable Housing in the Last Year: No         Current Outpatient Medications:     levetiracetam XR (KEPPRA XR) 750 mg Tb24 tablet, Take 1 tablet (750 mg total) by mouth 2 (two) times daily., Disp: 180 tablet, Rfl: 3    loratadine (CLARITIN) 10 mg tablet, Take 10 mg by mouth daily as needed., Disp: , Rfl:     sertraline (ZOLOFT) 50 MG tablet, Take 1 tablet (50 mg total) by mouth once daily., Disp: 90 tablet, Rfl: 3     Review of Systems   Constitutional:  Negative for malaise/fatigue.   HENT:  Negative for congestion and sore throat.    Eyes:  Negative for blurred vision.    Respiratory:  Negative for cough and shortness of breath.    Cardiovascular:  Negative for chest pain and leg swelling.   Gastrointestinal:  Positive for diarrhea. Negative for abdominal pain, constipation and nausea.   Genitourinary:  Negative for dysuria.   Musculoskeletal:  Negative for joint pain.   Neurological:  Negative for headaches.   Psychiatric/Behavioral:  Negative for depression. The patient is not nervous/anxious.         Objective:      Vitals:    01/24/24 1058   BP: 108/78   Pulse: 64   Temp: 98 °F (36.7 °C)       Physical Exam  Vitals and nursing note reviewed.   Constitutional:       Appearance: Normal appearance.   HENT:      Head: Normocephalic and atraumatic.   Cardiovascular:      Rate and Rhythm: Normal rate and regular rhythm.   Pulmonary:      Effort: Pulmonary effort is normal.      Breath sounds: Normal breath sounds. No wheezing or rales.   Abdominal:      General: Bowel sounds are normal.      Palpations: Abdomen is soft.      Tenderness: There is no abdominal tenderness.   Musculoskeletal:      Right lower leg: No edema.      Left lower leg: No edema.   Skin:     General: Skin is warm and dry.   Neurological:      General: No focal deficit present.      Mental Status: He is alert and oriented to person, place, and time.   Psychiatric:         Mood and Affect: Mood normal.         Behavior: Behavior normal.          Assessment:       1. Annual physical exam    2. Nail biting    3. MILEY (generalized anxiety disorder)    4. Nonintractable generalized idiopathic epilepsy without status epilepticus    5. Irritable bowel syndrome with diarrhea          Plan:   1. Annual physical exam  Assessment & Plan:  - had flu recently, vaccine deferred   - annual labs   - Reviewed medical, surgical, family and social history.       Orders:  -     CBC Auto Differential; Future; Expected date: 01/24/2024  -     Comprehensive Metabolic Panel; Future; Expected date: 01/24/2024  -     Hemoglobin A1C; Future;  Expected date: 01/24/2024  -     Lipid Panel; Future; Expected date: 01/24/2024  -     CBC Auto Differential; Future; Expected date: 01/24/2025  -     Comprehensive Metabolic Panel; Future; Expected date: 01/24/2025  -     Hemoglobin A1C; Future; Expected date: 01/24/2025  -     Lipid Panel; Future; Expected date: 01/24/2025    2. Nail biting  Comments:  Persistent. Related to anxiety as below. Cont SSRI, therapy.   Assessment & Plan:  Chronic, controlled. Related to anxiety     - continue lexapro 50mg daily    Orders:  -     sertraline (ZOLOFT) 50 MG tablet; Take 1 tablet (50 mg total) by mouth once daily.  Dispense: 90 tablet; Refill: 3    3. MILEY (generalized anxiety disorder)  Comments:  Controlled, but decrease Zoloft to 50 d/t decreased libido. Monitor sx; may taper more slowly alternating doses qOD if needed. Cont therapy.  Assessment & Plan:  Chronic, controlled     - continue lexapro 50mg daily    Orders:  -     sertraline (ZOLOFT) 50 MG tablet; Take 1 tablet (50 mg total) by mouth once daily.  Dispense: 90 tablet; Refill: 3    4. Nonintractable generalized idiopathic epilepsy without status epilepticus  Assessment & Plan:  Chronic, controlled     - continue Keppra 750mg daily      5. Irritable bowel syndrome with diarrhea  Assessment & Plan:  Chronic, controlled without medication. Reports the symptoms are mild.     - continue to monitor for now. Symptomatic treatment as needed           Follow up in about 1 year (around 1/24/2025) for annual.

## 2024-01-24 NOTE — ASSESSMENT & PLAN NOTE
Chronic, controlled without medication. Reports the symptoms are mild.     - continue to monitor for now. Symptomatic treatment as needed

## 2024-01-24 NOTE — PROGRESS NOTES
Health Maintenance Due   Topic     Influenza Vaccine (1) Pt decline    COVID-19 Vaccine (6 - 2023-24 season) Not offered at this office

## 2024-01-24 NOTE — ASSESSMENT & PLAN NOTE
- had flu recently, vaccine deferred   - annual labs   - Reviewed medical, surgical, family and social history.

## 2024-03-27 ENCOUNTER — PATIENT MESSAGE (OUTPATIENT)
Dept: FAMILY MEDICINE | Facility: CLINIC | Age: 34
End: 2024-03-27
Payer: COMMERCIAL

## 2024-03-27 DIAGNOSIS — Z30.09 VASECTOMY EVALUATION: Primary | ICD-10-CM

## 2024-04-29 PROBLEM — Z00.00 ANNUAL PHYSICAL EXAM: Status: RESOLVED | Noted: 2024-01-24 | Resolved: 2024-04-29

## 2024-05-06 ENCOUNTER — TELEPHONE (OUTPATIENT)
Dept: UROLOGY | Facility: CLINIC | Age: 34
End: 2024-05-06
Payer: COMMERCIAL

## 2024-06-05 ENCOUNTER — PATIENT MESSAGE (OUTPATIENT)
Dept: FAMILY MEDICINE | Facility: CLINIC | Age: 34
End: 2024-06-05
Payer: COMMERCIAL

## 2024-06-05 DIAGNOSIS — G40.309 NONINTRACTABLE GENERALIZED IDIOPATHIC EPILEPSY WITHOUT STATUS EPILEPTICUS: ICD-10-CM

## 2024-06-05 RX ORDER — LEVETIRACETAM 750 MG/1
750 TABLET, EXTENDED RELEASE ORAL 2 TIMES DAILY
Qty: 180 TABLET | Refills: 3 | Status: SHIPPED | OUTPATIENT
Start: 2024-06-05

## 2024-06-11 ENCOUNTER — OFFICE VISIT (OUTPATIENT)
Dept: UROLOGY | Facility: CLINIC | Age: 34
End: 2024-06-11
Payer: COMMERCIAL

## 2024-06-11 VITALS — WEIGHT: 163.38 LBS | BODY MASS INDEX: 24.84 KG/M2

## 2024-06-11 DIAGNOSIS — Z30.09 VASECTOMY EVALUATION: Primary | ICD-10-CM

## 2024-06-11 PROCEDURE — 1159F MED LIST DOCD IN RCRD: CPT | Mod: CPTII,S$GLB,, | Performed by: UROLOGY

## 2024-06-11 PROCEDURE — 3044F HG A1C LEVEL LT 7.0%: CPT | Mod: CPTII,S$GLB,, | Performed by: UROLOGY

## 2024-06-11 PROCEDURE — 99203 OFFICE O/P NEW LOW 30 MIN: CPT | Mod: S$GLB,,, | Performed by: UROLOGY

## 2024-06-11 PROCEDURE — 1160F RVW MEDS BY RX/DR IN RCRD: CPT | Mod: CPTII,S$GLB,, | Performed by: UROLOGY

## 2024-06-11 PROCEDURE — 99999 PR PBB SHADOW E&M-EST. PATIENT-LVL III: CPT | Mod: PBBFAC,,, | Performed by: UROLOGY

## 2024-06-11 PROCEDURE — 3008F BODY MASS INDEX DOCD: CPT | Mod: CPTII,S$GLB,, | Performed by: UROLOGY

## 2024-06-11 RX ORDER — DIAZEPAM 10 MG/1
10 TABLET ORAL ONCE
Qty: 1 TABLET | Refills: 0 | Status: SHIPPED | OUTPATIENT
Start: 2024-06-11 | End: 2024-06-11

## 2024-06-11 NOTE — PATIENT INSTRUCTIONS
What to Expect After a Vasectomy  You cannot drive or operate heavy machinery on the day of the procedure. Begin prescription antibiotics today and take as directed until finished. Dr. Berrios will give you a prescription for a narcotic pain medication. You may choose to take the prescription medication or Tylenol only for minor discomfort. Do not take any NSAIDS (e.g., Motrin, Naprosyn, etc.) or aspirin for at least one week, as these products can thin the blood.    Apply ice packs to the scrotal area for 24-48 hours. Avoid direct contact of the ice pack with the skin. Scrotal supports, jock straps, or fitted underwear help elevate the scrotum and reduce discomfort.    You may shower the next day. Gently apply soapy water to the scrotum to wash. Rinse and dry yourself by blotting the skin, not rubbing.    Avoid strenuous physical exercises or sexual relations for at least one week after a vasectomy.    Continue to use birth control for at least 6-8 weeks or 20 ejaculations. You are still considered fertile until your urologist examines a post-vasectomy semen analysis after 20 ejaculations and a second one a few days after the first.  Do NOT resume unprotected sexual activity until your physician finds no sperm in your semen.    All stitches will dissolve on their own in 1-2 weeks.    Signs and Symptoms to Report  A large amount of bleeding at the site.  An unusual amount of pain.  A large amount of swelling in the scrotum.  Fever and chills.  Any signs of infection, such as redness at the site or foul-smelling discharge    Risks  The risks of complication after vasectomy are very low.    A few of the risks include:  Bleeding.  Infection.  Scrotal hematoma - a collection of blood in the scrotum.  Inflammation of the epididymis - inflammation of a structure next to the testicle that helps in maturation of the sperm.  Sperm granuloma - a collection of sperm that leaks out from the vas deferens, forming a small nodule  or lump. This does not usually cause any discomfort, but you may feel it in the scrotum.  Recanalization - the restoration of the lumen or transport tube between the two ends of the vas deferens, possibly causing fertility.    If you have any questions or concerns, please call Dr. Berrios at [phone] during regular office hours. If there are any problems after hours or on weekends, you may call [phone] and ask to talk to the Urologist on call.

## 2024-06-11 NOTE — PROGRESS NOTES
Subjective:       Patient ID: Travis Bennett is a 34 y.o. male who was referred by Michelle Foreman MD    Chief Complaint:   Chief Complaint   Patient presents with    Sterilization       Vasectomy Consult  Patient here for pre-vasectomy consultation. He denies hematuria, urinary tract infections, urolithiasis, prostatitis, erectile dysfunction, previous genitourinary surgery, epididymal orchitis, testicular masses, scrotal trauma, sexually transmitted diseases, family history of prostate cancer. He was given the consent form, pre-vasectomy instruction sheet, and vasectomy booklet. I extensively reviewed with him the likely postoperative recuperative period as well as the need to continue to use contraception until he is notified by us of his sterility. He will have a semen analysis after 25 ejaculations. He understands the potential side effects of anesthesia, bleeding, scrotal hematoma, wound infection, epididymal orchitis, epididymal congestion, chronic testicular pain requiring further surgery, sperm granuloma, antisperm antibodies, early recanalization, spontaneous recanalization with pregnancy after demonstration of azoospermia and the possible association with prostate cancer. He is aware of alternatives to vasectomy. He has given this careful consideration and wishes to proceed with a vasectomy.     ACTIVE MEDICAL ISSUES:  Patient Active Problem List   Diagnosis    Epilepsy    MILEY (generalized anxiety disorder)    Irritable bowel syndrome with diarrhea    Multiple nevi    Seborrheic keratoses    Insomnia    Nail biting       PAST MEDICAL HISTORY  Past Medical History:   Diagnosis Date    Epilepsy        PAST SURGICAL HISTORY:  Past Surgical History:   Procedure Laterality Date    SHOULDER SURGERY Left 2012    d/t seizures       SOCIAL HISTORY:  Social History     Tobacco Use    Smoking status: Former     Current packs/day: 0.00     Types: Cigarettes     Start date: 1/1/2016     Quit date: 1/1/2016      Years since quittin.4    Tobacco comments:     Did smoke some in college but haven't in several years now   Substance Use Topics    Alcohol use: Yes     Alcohol/week: 10.0 standard drinks of alcohol     Types: 10 Cans of beer per week     Comment: Depends on the circumstances    Drug use: Not Currently     Types: Marijuana       FAMILY HISTORY:  Family History   Problem Relation Name Age of Onset    Asthma Mother Farideh Morrow     Breast cancer Mother Farideh Morrow     Seizures Mother Farideh Morrow     Anxiety disorder Father      Alcohol abuse Maternal Grandfather Gibson Morrison     Drug abuse Maternal Grandfather Gibson Morrison     Heart attack Maternal Grandfather Gibson Morrison          of heart attack at 58    Heart disease Paternal Grandfather Dejan Bennett         Obese, had heart attacks    Colon cancer Neg Hx      Prostate cancer Neg Hx         ALLERGIES AND MEDICATIONS: updated and reviewed.  Review of patient's allergies indicates:  No Known Allergies  Current Outpatient Medications   Medication Sig    levetiracetam XR (KEPPRA XR) 750 mg Tb24 tablet Take 1 tablet (750 mg total) by mouth 2 (two) times daily.    loratadine (CLARITIN) 10 mg tablet Take 10 mg by mouth daily as needed.    sertraline (ZOLOFT) 50 MG tablet Take 1 tablet (50 mg total) by mouth once daily.    diazePAM (VALIUM) 10 MG Tab Take 1 tablet (10 mg total) by mouth once. for 1 dose     No current facility-administered medications for this visit.       Review of Systems   Constitutional:  Negative for chills and fever.   HENT:  Negative for congestion.    Respiratory:  Negative for chest tightness and shortness of breath.    Cardiovascular:  Negative for chest pain and palpitations.   Gastrointestinal:  Negative for abdominal pain, constipation, diarrhea, nausea and vomiting.   Genitourinary:  Negative for difficulty urinating, dysuria, flank pain, hematuria and urgency.   Musculoskeletal:  Negative for arthralgias.    Neurological:  Negative for dizziness.   Psychiatric/Behavioral:  Negative for confusion.        Objective:      Vitals:    06/11/24 1130   Weight: 74.1 kg (163 lb 5.8 oz)     Physical Exam  Vitals and nursing note reviewed.   Constitutional:       Appearance: He is well-developed.   HENT:      Head: Normocephalic.   Eyes:      Conjunctiva/sclera: Conjunctivae normal.   Neck:      Thyroid: No thyromegaly.      Trachea: No tracheal deviation.   Cardiovascular:      Rate and Rhythm: Normal rate.      Heart sounds: Normal heart sounds.   Pulmonary:      Effort: Pulmonary effort is normal. No respiratory distress.      Breath sounds: Normal breath sounds. No wheezing.   Abdominal:      General: Bowel sounds are normal.      Palpations: Abdomen is soft.      Tenderness: There is no abdominal tenderness. There is no rebound.      Hernia: No hernia is present.   Genitourinary:     Penis: Normal and circumcised.       Testes: Normal.         Right: Mass or tenderness not present.         Left: Mass or tenderness not present.      Comments: Vas palpable  Musculoskeletal:         General: No tenderness. Normal range of motion.      Cervical back: Normal range of motion and neck supple.   Lymphadenopathy:      Cervical: No cervical adenopathy.   Skin:     General: Skin is warm and dry.      Findings: No erythema or rash.   Neurological:      Mental Status: He is alert and oriented to person, place, and time.   Psychiatric:         Behavior: Behavior normal.         Thought Content: Thought content normal.         Judgment: Judgment normal.       Urine dipstick shows not done.  Micro exam: not done.    Assessment:       1. Vasectomy evaluation          Plan:       1. Vasectomy evaluation    - Ambulatory referral/consult to Urology  - diazePAM (VALIUM) 10 MG Tab; Take 1 tablet (10 mg total) by mouth once. for 1 dose  Dispense: 1 tablet; Refill: 0  - Vasectomy; Future            Follow up for vasectomy.

## 2024-06-18 ENCOUNTER — PROCEDURE VISIT (OUTPATIENT)
Dept: UROLOGY | Facility: CLINIC | Age: 34
End: 2024-06-18
Payer: COMMERCIAL

## 2024-06-18 ENCOUNTER — PATIENT MESSAGE (OUTPATIENT)
Dept: UROLOGY | Facility: CLINIC | Age: 34
End: 2024-06-18

## 2024-06-18 VITALS — BODY MASS INDEX: 24.81 KG/M2 | WEIGHT: 163.13 LBS

## 2024-06-18 DIAGNOSIS — Z30.09 VASECTOMY EVALUATION: Primary | ICD-10-CM

## 2024-06-18 DIAGNOSIS — Z30.09 VASECTOMY EVALUATION: ICD-10-CM

## 2024-06-18 PROCEDURE — 88302 TISSUE EXAM BY PATHOLOGIST: CPT | Performed by: PATHOLOGY

## 2024-06-18 PROCEDURE — 55250 REMOVAL OF SPERM DUCT(S): CPT | Mod: S$GLB,,, | Performed by: UROLOGY

## 2024-06-18 RX ORDER — CEPHALEXIN 500 MG/1
500 CAPSULE ORAL EVERY 8 HOURS
Qty: 21 CAPSULE | Refills: 0 | Status: SHIPPED | OUTPATIENT
Start: 2024-06-18 | End: 2024-06-25

## 2024-06-18 RX ORDER — HYDROCODONE BITARTRATE AND ACETAMINOPHEN 5; 325 MG/1; MG/1
1 TABLET ORAL EVERY 6 HOURS PRN
Qty: 28 TABLET | Refills: 0 | Status: SHIPPED | OUTPATIENT
Start: 2024-06-18

## 2024-06-18 RX ORDER — HYDROCODONE BITARTRATE AND ACETAMINOPHEN 5; 325 MG/1; MG/1
1 TABLET ORAL EVERY 6 HOURS PRN
Qty: 28 TABLET | Refills: 0 | Status: SHIPPED | OUTPATIENT
Start: 2024-06-18 | End: 2024-06-18 | Stop reason: SDUPTHER

## 2024-06-18 NOTE — TELEPHONE ENCOUNTER
----- Message from Ashleigh Umana sent at 6/18/2024  1:11 PM CDT -----  Type:  RX Refill Request    Who Called:  Pt  Refill or New Rx: Refill  RX Name and Strength: HYDROcodone-acetaminophen (NORCO) 5-325 mg per tablet  How is the patient currently taking it? (ex. 1XDay):   Is this a 30 day or 90 day RX:  Preferred Pharmacy with phone number: [facility]/pharmacy #[id number] - [place], [place] - [facility] [facility]  Local or Mail Order: Local  Ordering Provider: Dr. Berrios  Would the patient rather a call back or a response via [facility]? [facility]  Best Call Back Number:   Additional Information:  Pt states pharmacy do not have medication in stock.  Please forward prescription to [facility] as noted above

## 2024-06-18 NOTE — PATIENT INSTRUCTIONS
What to Expect After a Vasectomy  You cannot drive or operate heavy machinery on the day of the procedure. Begin prescription antibiotics today and take as directed until finished. Dr. Berrios will give you a prescription for a narcotic pain medication. You may choose to take the prescription medication or Tylenol only for minor discomfort. Do not take any NSAIDS (e.g., Motrin, Naprosyn, etc.) or aspirin for at least one week, as these products can thin the blood.    Apply ice packs to the scrotal area for 24-48 hours. Avoid direct contact of the ice pack with the skin. Scrotal supports, jock straps, or fitted underwear help elevate the scrotum and reduce discomfort.    You may shower the next day. Gently apply soapy water to the scrotum to wash. Rinse and dry yourself by blotting the skin, not rubbing.    Avoid strenuous physical exercises or sexual relations for at least one week after a vasectomy.    Continue to use birth control for at least 6-8 weeks or 20 ejaculations. You are still considered fertile until your urologist examines a post-vasectomy semen analysis after 20 ejaculations and a second one a few days after the first.  Do NOT resume unprotected sexual activity until your physician finds no sperm in your semen.    All stitches will dissolve on their own in 1-2 weeks.    Signs and Symptoms to Report  A large amount of bleeding at the site.  An unusual amount of pain.  A large amount of swelling in the scrotum.  Fever and chills.  Any signs of infection, such as redness at the site or foul-smelling discharge    Risks  The risks of complication after vasectomy are very low.    A few of the risks include:  Bleeding.  Infection.  Scrotal hematoma - a collection of blood in the scrotum.  Inflammation of the epididymis - inflammation of a structure next to the testicle that helps in maturation of the sperm.  Sperm granuloma - a collection of sperm that leaks out from the vas deferens, forming a small nodule  or lump. This does not usually cause any discomfort, but you may feel it in the scrotum.  Recanalization - the restoration of the lumen or transport tube between the two ends of the vas deferens, possibly causing fertility.    If you have any questions or concerns, please call Dr. Berrios at [phone] during regular office hours. If there are any problems after hours or on weekends, you may call [phone] and ask to talk to the Urologist on call.

## 2024-06-21 LAB
FINAL PATHOLOGIC DIAGNOSIS: NORMAL
GROSS: NORMAL
Lab: NORMAL

## 2024-07-30 ENCOUNTER — OFFICE VISIT (OUTPATIENT)
Dept: UROLOGY | Facility: CLINIC | Age: 34
End: 2024-07-30
Payer: COMMERCIAL

## 2024-07-30 VITALS — WEIGHT: 163.94 LBS | BODY MASS INDEX: 24.92 KG/M2

## 2024-07-30 DIAGNOSIS — Z30.09 VASECTOMY EVALUATION: Primary | ICD-10-CM

## 2024-07-30 PROCEDURE — 3044F HG A1C LEVEL LT 7.0%: CPT | Mod: CPTII,S$GLB,, | Performed by: UROLOGY

## 2024-07-30 PROCEDURE — 1160F RVW MEDS BY RX/DR IN RCRD: CPT | Mod: CPTII,S$GLB,, | Performed by: UROLOGY

## 2024-07-30 PROCEDURE — 99024 POSTOP FOLLOW-UP VISIT: CPT | Mod: S$GLB,,, | Performed by: UROLOGY

## 2024-07-30 PROCEDURE — 1159F MED LIST DOCD IN RCRD: CPT | Mod: CPTII,S$GLB,, | Performed by: UROLOGY

## 2024-07-30 PROCEDURE — 99999 PR PBB SHADOW E&M-EST. PATIENT-LVL III: CPT | Mod: PBBFAC,,, | Performed by: UROLOGY

## 2024-07-30 NOTE — PROGRESS NOTES
Subjective:       Patient ID: Travis Bennett is a 34 y.o. male who was last seen in this office 6/18/2024    Chief Complaint:   Chief Complaint   Patient presents with    Follow-up     vasectomy f/u       Post-Operative Follow-up  Patient here for post-op follow-up. Patient is 6 weeks status post vasectomy on 6/18/2024.   The patient reports no problems with eating, bowel movements, voiding, or their wound. The patient is not having any pain.    ACTIVE MEDICAL ISSUES:  Patient Active Problem List   Diagnosis    Epilepsy    MILEY (generalized anxiety disorder)    Irritable bowel syndrome with diarrhea    Multiple nevi    Seborrheic keratoses    Insomnia    Nail biting       ALLERGIES AND MEDICATIONS: updated and reviewed.  Review of patient's allergies indicates:  No Known Allergies  Current Outpatient Medications   Medication Sig    levetiracetam XR (KEPPRA XR) 750 mg Tb24 tablet Take 1 tablet (750 mg total) by mouth 2 (two) times daily.    loratadine (CLARITIN) 10 mg tablet Take 10 mg by mouth daily as needed.    sertraline (ZOLOFT) 50 MG tablet Take 1 tablet (50 mg total) by mouth once daily.    diazePAM (VALIUM) 10 MG Tab Take 1 tablet (10 mg total) by mouth once. for 1 dose    HYDROcodone-acetaminophen (NORCO) 5-325 mg per tablet Take 1 tablet by mouth every 6 (six) hours as needed for Pain. (Patient not taking: Reported on 7/30/2024)     No current facility-administered medications for this visit.       Review of Systems   Constitutional:  Negative for chills and fever.   HENT:  Negative for congestion.    Respiratory:  Negative for chest tightness and shortness of breath.    Cardiovascular:  Negative for chest pain and palpitations.   Gastrointestinal:  Negative for abdominal pain, constipation, diarrhea, nausea and vomiting.   Genitourinary:  Negative for difficulty urinating, dysuria, flank pain, hematuria and urgency.   Musculoskeletal:  Negative for arthralgias.   Neurological:  Negative for dizziness.    Psychiatric/Behavioral:  Negative for confusion.        Objective:      Vitals:    07/30/24 1006   Weight: 74.3 kg (163 lb 14.6 oz)     Physical Exam  Vitals and nursing note reviewed.   Constitutional:       Appearance: He is well-developed.   HENT:      Head: Normocephalic.   Eyes:      Conjunctiva/sclera: Conjunctivae normal.   Neck:      Thyroid: No thyromegaly.      Trachea: No tracheal deviation.   Cardiovascular:      Rate and Rhythm: Normal rate.      Heart sounds: Normal heart sounds.   Pulmonary:      Effort: Pulmonary effort is normal. No respiratory distress.      Breath sounds: Normal breath sounds. No wheezing.   Abdominal:      General: Bowel sounds are normal.      Palpations: Abdomen is soft.      Tenderness: There is no abdominal tenderness. There is no rebound.      Hernia: No hernia is present.   Musculoskeletal:         General: No tenderness. Normal range of motion.      Cervical back: Normal range of motion and neck supple.   Lymphadenopathy:      Cervical: No cervical adenopathy.   Skin:     General: Skin is warm and dry.      Findings: No erythema or rash.   Neurological:      Mental Status: He is alert and oriented to person, place, and time.   Psychiatric:         Behavior: Behavior normal.         Thought Content: Thought content normal.         Judgment: Judgment normal.         Urine dipstick shows not done.  Micro exam: not done.    Semen:  No sperm seen in 30 HPF    Collected: 06/18/24 1130   Result status: Final   Resulting lab: [facility]   Value: 1. Left vas deferens, vasectomy:  Completely transected vas deferens with no diagnostic histopathologic abnormalities    2. Right vas deferens, vasectomy:  Completely transected vas deferens with no diagnostic histopathologic abnormalities   Comment: Interp By Humza Alfred MD, Signed on 06/21/2024 at 11:44       Assessment:       1. Vasectomy evaluation          Plan:       1. Vasectomy evaluation  He  will drop off a confirmatory specimen in 2 week            Follow up if symptoms worsen or fail to improve.

## 2024-08-16 ENCOUNTER — TELEPHONE (OUTPATIENT)
Dept: UROLOGY | Facility: HOSPITAL | Age: 34
End: 2024-08-16
Payer: COMMERCIAL

## 2024-08-16 NOTE — TELEPHONE ENCOUNTER
----- Message from Marissa Morales MA sent at 8/16/2024 11:45 AM CDT -----  Semen sample on scope. Pt would like message sent to portal.

## 2024-08-27 ENCOUNTER — TELEPHONE (OUTPATIENT)
Dept: INTERNAL MEDICINE | Facility: CLINIC | Age: 34
End: 2024-08-27
Payer: COMMERCIAL

## 2024-08-27 NOTE — TELEPHONE ENCOUNTER
Patient spouse still wanted me to forward message to you:    Stephani Cook MA (You)  2024 10:12 AM  You're welcome! Looks like she has an establish care appointment in December with a female provider. I'm forwarding your message to Dr. Jerez now.         Travis Bennett  2024 10:05 AM  Ke Styles,    Yes please. I know Alyce has a preference for a female provider. I personally see Dr Michelle Foreman on [facility] and she would probably be fine for Alyce as well. Thank you for your help. If you could still relay to Dr. Jerez, that would be helpful, as I know she had spoken to Alyce earlier last week.    Thank you,  Stephani Moise MA (You)  2024 10:01 AM  My apologies but Dr. Jerez is a virtual provider and has been for the past year. She does not see any patients for primary care but more so for urgent care needs. I viewed 's chart and saw that she will need a PCP for further care. I didn't want you both to be steered in the wrong direction. Can we get her scheduled for an establish care appointment with one of our other providers here at [facility]?    Thanks for choosing [facility]!  GENEVA Styles      Appointment Request   Travis Bennett   Sent:  10:05 AM   To: [facility] Internal Medicine Clinical Support Staff      Travis Bennett   MRN: [id number] : 1990   Pt Home: [phone]     Entered: [phone]        Message    Ke Styles,      Yes please. I know Alyce has a preference for a female provider. I personally see Dr Michelle Foreman on the [place], and she would probably be fine for Alyce as well. Thank you for your help. If you could still relay to Dr. Jerez, that would be helpful, as I know she had spoken to Alyce earlier last week.      Thank you,   Magali     Primary Coverage(Effective 2020)    Payer Plan Group Number Group Name Effective From Effective To   [facility] BCBS ALL OUT OF STATE [id number]  2020      Patient Demographics    Address  [address]  [facility] [address] Phone  [phone] (Home)  [phone] (Mobile) *Preferred* E-mail Address  [email or web address]     # of No Show in Current Department:0     Future Appointments  8/27/2024 - 8/27/2025   Date Visit Type Department Provider    1/22/2025  9:30 AM FASTING LAB [facility] ([facility]) - Lab [facility]           1/24/2025 11:40 AM EP - PRIMARY CARE ([facility]) [facility] - Family Medicine Michelle Foreman MD

## 2024-08-27 NOTE — TELEPHONE ENCOUNTER
Response below after asking patient spouse if they'd like to have virtual visit with you on today.          Appointment Request   Travis Bennett   Sent:   9:49 AM   To: [facility] Internal Medicine Clinical Support Staff      Travis Bennett   MRN: [id number] : 1990   Pt Home: [phone]     Entered: [phone]        Message    Ke Styles,      Sadly, the situation escalated to the point where Alyce had to be hospitalized. She is receiving in-patient care at [facility] after experiencing psychosis together with manic symptoms. Please let Dr. Wade know.      Thank you,   Pat     Primary Coverage(Effective 2020)    Payer Plan Group Number Group Name Effective From Effective To   [facility] BCBS ALL OUT OF STATE [id number]  2020      Patient Demographics    Address  [address] [place] [address] Phone  [phone] (Home)  [phone] (Mobile) *Preferred* E-mail Address  [email or web address]     # of No Show in Current Department:0     Future Appointments  2024 - 2025   Date Visit Type Department Provider    2025  9:30 AM FASTING LAB [facility] ([facility]) - Lab LAB[facility]IMIR           2025 11:40 AM EP - PRIMARY CARE ([facility]) [facility] - Family Medicine Michelle Foreman MD

## 2024-08-27 NOTE — TELEPHONE ENCOUNTER
Awaiting spouse response for an appointment on today with Dr. Jerez at either 10am, 10:40am, 11am, 11:20am or 11:40am.        Appointment Request   Travis Bennett   Sent:  11:06 AM   To: [facility] Internal Medicine Clinical Support Staff      Travis Bennett   MRN: [id number] : 1990   Pt Home: [phone]     Entered: [phone]        Message    Appointment Request From: Travis Bennett      With Provider: Farideh Jerez [[facility] - Internal Medicine]      Preferred Date Range: 2024 - 2024      Preferred Times: Any Time      Reason for visit: Worried about wifes mental health      Comments:   My wife, a patient of Dr Jerez, is experiencing a crisis, and I believe it is important that I convey this to Dr Jerez for sake of Alyce.     Primary Coverage(Effective 2020)    Payer Plan Group Number Group Name Effective From Effective To   [facility] BCBS ALL OUT OF STATE [id number]  2020      Patient Demographics    Address  [address] [place] [address] Phone  [phone] (Home)  [phone] (Mobile) *Preferred* E-mail Address  [email or web address]     # of No Show in Current Department:0     Future Appointments  2024 - 2025   Date Visit Type Department Provider    2025  9:30 AM FASTING LAB [facility] ([facility]) - Lab LAB[facility]IMIR           2025 11:40 AM EP - PRIMARY CARE ([facility]) [facility] - Family Medicine Michelle Foreman MD

## 2024-09-06 ENCOUNTER — OFFICE VISIT (OUTPATIENT)
Dept: PSYCHIATRY | Facility: OTHER | Age: 34
End: 2024-09-06
Payer: COMMERCIAL

## 2024-09-06 DIAGNOSIS — F41.1 GAD (GENERALIZED ANXIETY DISORDER): Primary | ICD-10-CM

## 2024-09-06 PROCEDURE — 90791 PSYCH DIAGNOSTIC EVALUATION: CPT | Mod: 95,,, | Performed by: SOCIAL WORKER

## 2024-09-06 PROCEDURE — 3044F HG A1C LEVEL LT 7.0%: CPT | Mod: CPTII,95,, | Performed by: SOCIAL WORKER

## 2024-09-06 NOTE — PROGRESS NOTES
The patient location is: Home ([facility])  The chief complaint leading to consultation is: f/u anxiety    Visit type: audiovisual    Face to Face time with patient: 50 mins  60 minutes of total time spent on the encounter, which includes face to face time and non-face to face time preparing to see the patient (eg, review of tests), Obtaining and/or reviewing separately obtained history, Documenting clinical information in the electronic or other health record, Independently interpreting results (not separately reported) and communicating results to the patient/family/caregiver, or Care coordination (not separately reported).         Each patient to whom he or she provides medical services by telemedicine is:  (1) informed of the relationship between the physician and patient and the respective role of any other health care provider with respect to management of the patient; and (2) notified that he or she may decline to receive medical services by telemedicine and may withdraw from such care at any time.    Type of service: Individual    Content of session: Pt presents to first session in over a year. Notes his wife has been having a mental health crisis for about 3 weeks.She went in to the ED for an evaluation, was PECd, spent 10 days at [facility]. She was placed on Seroquel while she was inpt. Was discharged last Sunday, states she didn't believe the dx, was angry at the doctor, angry at pt, notes she was very traumatized by the situation. Pt states wife believes that pt threatened her and the kids, pt accused him of having an affair with a neighbor. Notes she was very angry, pt called the police as he was afraid as she was walking in traffic. One of the officers gave her a ride to her Mom's condo. Pt notes wife continues to use MJ, has a vape that she uses. Notes she is very paranoid, she believes that one of her doctors is after her. He brought her to her Mom's place, Mom got into the car and they went and got  "something to eat, notes she accused a customer at the [facility] of being "in on it". Notes this past Monday they got in a fight bc pt suggested that she was manic. Notes on Tuesday he called the psych unit again and they advised him to PEC her as she was a danger to him and the children. Wife then tried to kick her Mom out of their house. He went to the police to try to get the PEC, notes taht while he was gone his wife called the Crisis response team and told them that pt was abusing her. Notes that she then started to text all of their friends about him abusing her, notes she cut off all of her hair. She then started accusing him of withholding her medications. He did get the PEC and executed it, but notes the  believed it was a domestic situation and she was released almost immediately. She continued to take Seroquel, pt states he is noticing slight changes in the level of anger and erratic behavior. He stayed at a hotel with his Mom and the kids, MIL and wife then filed a restraining order on him. States that as of today she is still away with her Mom and is sending him "kind" emails and she came to the house and left "kind" post it notes all over the house. Notes the court date for the restraining order is 9/23. Notes Mom flew in to support him, she just left today. Notes he has not yet been served with a restraining order. Notes his wife has mentioned feeling controlled by white men, so he's trying to not suggest anything to her any longer. Notes he is trying to figure out how to be less of a trigger for her, notes he knows "I can pry", "I need to accept that I can't force her to do anything". Pt endorses "severe anxiety", notes he has always had difficulty being out of control. Discussed coping skills and safety plan, will f/u in 1 week.      Therapeutic techniques and approaches: behavior modification and supportive counseling. Rationale: appropriate for presenting issues.     Pt denies " SI/HI. Mood was euthymic, affect matches verbal content. AAOx3, participated fully in session.     Time spent in counselinmins

## 2024-09-09 NOTE — TELEPHONE ENCOUNTER
"You can drop off another sample here and I can look at it, or you can send a sample to a 3rd party company.  The company name is [employer].    You can go to Getting-[employer]  Request a vasectomy test kit.  Use the code "WBUROLOGY" and the results will be linked to our office so I can see the results.    Dr. Berrios  "

## 2024-09-10 ENCOUNTER — PATIENT MESSAGE (OUTPATIENT)
Dept: PSYCHIATRY | Facility: OTHER | Age: 34
End: 2024-09-10
Payer: COMMERCIAL

## 2024-09-11 ENCOUNTER — OFFICE VISIT (OUTPATIENT)
Dept: PSYCHIATRY | Facility: OTHER | Age: 34
End: 2024-09-11
Payer: COMMERCIAL

## 2024-09-11 DIAGNOSIS — F41.1 GAD (GENERALIZED ANXIETY DISORDER): Primary | ICD-10-CM

## 2024-09-11 PROCEDURE — 3044F HG A1C LEVEL LT 7.0%: CPT | Mod: CPTII,95,, | Performed by: SOCIAL WORKER

## 2024-09-11 PROCEDURE — 90837 PSYTX W PT 60 MINUTES: CPT | Mod: 95,,, | Performed by: SOCIAL WORKER

## 2024-09-11 NOTE — PROGRESS NOTES
"The patient location is: Home ([facility])  The chief complaint leading to consultation is: f/u anxiety    Visit type: audiovisual    Face to Face time with patient: 50 mins  60 minutes of total time spent on the encounter, which includes face to face time and non-face to face time preparing to see the patient (eg, review of tests), Obtaining and/or reviewing separately obtained history, Documenting clinical information in the electronic or other health record, Independently interpreting results (not separately reported) and communicating results to the patient/family/caregiver, or Care coordination (not separately reported).         Each patient to whom he or she provides medical services by telemedicine is:  (1) informed of the relationship between the physician and patient and the respective role of any other health care provider with respect to management of the patient; and (2) notified that he or she may decline to receive medical services by telemedicine and may withdraw from such care at any time.    Type of service: Individual    Content of session: Pt states things are going a bit better. Pt states, "I don't feel nearly as bad today as I did the last time we spoke". Notes everyone is in the house together now as they have a whole home generator, notes his wife and MIL are there with him, states things are better with MIL. Notes he and wife have been communicating better, notes "my goal is trying to rebuild trust". Notes he doesn't feel as afraid as he did after her first paranoid episode. "I think one of the big things I have to work on is controlling my urge to control". Notes wife is using MJ vape again and this makes him nervous, he has told her that but she has not stopped yet. Notes he has been doing a lot of reading of first person accounts of bipolar disorder, is realizing that this may take a long time for his wife to figure out what works for her. Notes wife has been able to acknowledge that not all of " "her experiences were rooted in reality. Pt states his goals are to rebuild trust with his wife, rebuild communication. Pt notes he had an uncle who was having a mixed manic episode and  by suicide when he was younger, notes he was also using drugs at the time, pt notes this was something he was thinking about a lot. Notes wife is "still a million miles per second". States "I really just want to have an open and honest chat where I don't feel like I'm walking on eggshells and coating everything I say in niceties". Pt endorses lots of anxiety "I feel like if I let go I feel this sense of loss of control so I don't do things for myself, and I feel like I linger and I cling. I want to find ways to deal with my anxiety, I want to be able to let go. What should I be doing for myself to move on or move forward?". Notes he wants to get back into a normal workflow, wants to get more sleep. 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 Discussed goals at length, discussed coping skills and acceptance for anxiety. Will f/u in 1 week.      Therapeutic techniques and approaches: behavior modification and supportive counseling. Rationale: appropriate for presenting issues.     Pt denies SI/HI. Mood was euthymic, affect matches verbal content. AAOx3, participated fully in session.     Time spent in counselinmins       "

## 2024-09-20 ENCOUNTER — OFFICE VISIT (OUTPATIENT)
Dept: PSYCHIATRY | Facility: OTHER | Age: 34
End: 2024-09-20
Payer: COMMERCIAL

## 2024-09-20 DIAGNOSIS — F41.1 GAD (GENERALIZED ANXIETY DISORDER): Primary | ICD-10-CM

## 2024-09-20 PROCEDURE — 90837 PSYTX W PT 60 MINUTES: CPT | Mod: 95,,, | Performed by: SOCIAL WORKER

## 2024-09-20 PROCEDURE — 3044F HG A1C LEVEL LT 7.0%: CPT | Mod: CPTII,95,, | Performed by: SOCIAL WORKER

## 2024-09-20 NOTE — PROGRESS NOTES
"The patient location is: [place]   The chief complaint leading to consultation is: f/u anxiety    Visit type: audiovisual    Face to Face time with patient: 50 mins  60 minutes of total time spent on the encounter, which includes face to face time and non-face to face time preparing to see the patient (eg, review of tests), Obtaining and/or reviewing separately obtained history, Documenting clinical information in the electronic or other health record, Independently interpreting results (not separately reported) and communicating results to the patient/family/caregiver, or Care coordination (not separately reported).         Each patient to whom he or she provides medical services by telemedicine is:  (1) informed of the relationship between the physician and patient and the respective role of any other health care provider with respect to management of the patient; and (2) notified that he or she may decline to receive medical services by telemedicine and may withdraw from such care at any time.    Type of service: Individual    Content of session: Pt is ok, states "some days feel borderline normal, then one day for no reason things will be worse". Notes wife is on medications and feels like she is doing ok, but notes wife does not feel like she needs to be on medications, has not set up therapy, "there is still this absolute rejection of her diagnosis". Notes wife is very angry at his Mom because his Mom came down here to help. Pt states his wife has told him that she doesn't need therapy, that only he needs therapy and that he is deluded. Notes "her perception of her reality feels very real to her. She sees herself as a victim in every sense". Notes she is "constantly puffing on her weed vape", notes she has been doing this for about a year. Pt states he has been trying to help her stay busy. She has agreed to go to couples' counseling, but notes "my biggest fear is that we do it and I'm telling my side of " "the story and that makes her angry". Pt admitted "I'm just trying not to say anything that sets her off, only saying positive things". Notes he has done some explosive movements since our last session when he felt anxiety, notes that has been helpful in the moment. Is walking during our session and states walks help him with stress mgmt. Notes "you can't have a healthy family life without stability in your mental health and then I can't help but think about where we're going to be in 6 months to a year and I think about sad outcomes". Pt notes he's not hopeless right now because she is taking her medication and she has a psych appt in a month, has been seeing her PCP intermittently, also. Notes he has gotten support from his brother and sister and law. Pt notes he is sleeping well as he is trying to set up a good sleeping environment for his wife. Notes he is eating more and has gained back some of the weight that he lost during the acute stress period. Notes he is biting his nails a lot as a result of the anxiety. Pt notes he has an appt with his PCP to discuss a referral to a psychiatrist who specializes in medication for compulsions. Notes he has been reflecting on some of the impulsive things she has done in the past and if this was hypomania. Discussed coping skills, will f/u in 1 week.      Therapeutic techniques and approaches: behavior modification and supportive counseling. Rationale: appropriate for presenting issues.     Pt denies SI/HI. Mood was euthymic, affect matches verbal content. AAOx3, participated fully in session.     Time spent in counselinmins       "

## 2024-09-23 ENCOUNTER — OFFICE VISIT (OUTPATIENT)
Dept: INTERNAL MEDICINE | Facility: CLINIC | Age: 34
End: 2024-09-23
Payer: COMMERCIAL

## 2024-09-23 ENCOUNTER — TELEPHONE (OUTPATIENT)
Dept: INTERNAL MEDICINE | Facility: CLINIC | Age: 34
End: 2024-09-23

## 2024-09-23 DIAGNOSIS — F41.1 GAD (GENERALIZED ANXIETY DISORDER): ICD-10-CM

## 2024-09-23 DIAGNOSIS — G47.00 INSOMNIA, UNSPECIFIED TYPE: ICD-10-CM

## 2024-09-23 DIAGNOSIS — F98.8 NAIL BITING: Primary | ICD-10-CM

## 2024-09-23 PROCEDURE — 3044F HG A1C LEVEL LT 7.0%: CPT | Mod: CPTII,95,, | Performed by: INTERNAL MEDICINE

## 2024-09-23 PROCEDURE — 1160F RVW MEDS BY RX/DR IN RCRD: CPT | Mod: CPTII,95,, | Performed by: INTERNAL MEDICINE

## 2024-09-23 PROCEDURE — 99215 OFFICE O/P EST HI 40 MIN: CPT | Mod: 95,,, | Performed by: INTERNAL MEDICINE

## 2024-09-23 PROCEDURE — 1159F MED LIST DOCD IN RCRD: CPT | Mod: CPTII,95,, | Performed by: INTERNAL MEDICINE

## 2024-09-23 PROCEDURE — G2211 COMPLEX E/M VISIT ADD ON: HCPCS | Mod: 95,,, | Performed by: INTERNAL MEDICINE

## 2024-09-23 RX ORDER — SERTRALINE HYDROCHLORIDE 50 MG/1
25 TABLET, FILM COATED ORAL DAILY
Start: 2024-09-23 | End: 2024-09-23 | Stop reason: SDUPTHER

## 2024-09-23 RX ORDER — PAROXETINE 10 MG/1
10 TABLET, FILM COATED ORAL EVERY MORNING
Qty: 30 TABLET | Refills: 11 | Status: SHIPPED | OUTPATIENT
Start: 2024-09-23 | End: 2025-09-23

## 2024-09-23 RX ORDER — SERTRALINE HYDROCHLORIDE 50 MG/1
25 TABLET, FILM COATED ORAL DAILY
Start: 2024-09-23 | End: 2024-09-30

## 2024-09-23 NOTE — PROGRESS NOTES
The patient location is: [place]  The chief complaint leading to consultation is: Anxiety  Visit type: Virtual visit with synchronous audio and video  Contact time spent with patient: 19 minutes  Each patient to whom he or she provides medical services by telemedicine is:  (1) informed of the relationship between the physician and patient and the respective role of any other health care provider with respect to management of the patient; and (2) notified that he or she may decline to receive medical services by telemedicine and may withdraw from such care at any time.  Subjective:       Patient ID: Travis Bennett is a 34 y.o. male who  has a past medical history of Epilepsy.    Chief Complaint: Anxiety     History was obtained from the patient and supplemented through chart review.  He is a patient of Michelle Foreman MD.  Was last seen  for annual.    Moved here from [place].  Grad school at [employer].  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  He is classically trained in piano, sings, electronic music.  His wife is Alyce Bennett.  They have 2 children.    HPI     Nail biting, OCD:  Has been present for all of his life.  Anxiety as below. Compulsion. No other repetitive activities. Tried nail polish with some relief.  Triggers: will unconsciously do this when lost in thought, work. Has anxiety and perseverating thoughts; feels that he's forgetting something.  Has caused some teeth chipping.  Has some jaw pain. Wears a mouth guard. Eats ice.  No ice craving.    Stress at home lately.  His wife was admitted for bipolar disorder, robin and has been in denial of her diagnosis.  They are interested in couple's therapy.    Seeing Dr. Thu Chairez.  Therapy has been helpful, but has plateaued and has persistent nail biting.   He is requesting Psychiatry referral for OCD, nail biting.    MILEY:  Chronic. Started in middle school; moved a lot at the time. Social anxiety.  Exercise helps.   Following c therapy as above.     Previous meds: Trazodone  caused priaprism.     FHx:  Mom is on Paxil/Paroxetine, which was helpful for her.     Has been on Zoloft since 2022.  Zoloft helps with anxiety.   Zoloft 100 mg caused restless legs, decreased libido.  Did not notice much benefit with 100 mg.  He is currently taking 50 mg.  Helps with overall anxiety, but persistent OCD.  Denies side effects.      Mood:  Mainly anxiety.  Doesn't feel depression.  Sleep:  Doing well d/t improved sleep hygiene.    Energy: good  Concentration: can perseverate that he's forgotten something.  Appetite: Had lost 10 lbs recently d/t decreased PO intake d/t stress. Gained 5 lbs back.   Snacks, Cheez Its at times, more so when stressed.  Trying to have healthier snacks at home.  Not a big problem with diet, appetite.     Insomnia:  Was rx'd Trazodone p.r.n. for sleep in the past, but caused priaprism. No longer having issues with sleep d/t improved sleep hygiene.            Not addressed today.  IBS with diarrhea:  Upset stomach prior to BM.  Since middle school. Related to anxiety.  No change when cutting out dairy, grain.  Varies. Associated with anxiety.     Epilepsy:  +FHx.  Since college.  Occurred during an exam.  Tonic clonic.  Resulted in left shoulder dislocation.  Was unable to taper off AEDs d/t recurrence.  Last seizure 8/2017 d/t missed doses. Is on Keppra BID.  Controlled. Cont Keppra BID. F/u with Neurology.      Review of Systems   Constitutional:  Positive for unexpected weight change. Negative for activity change.   HENT:  Negative for hearing loss, rhinorrhea and trouble swallowing.    Eyes:  Negative for discharge and visual disturbance.   Respiratory:  Negative for chest tightness and wheezing.    Cardiovascular:  Negative for chest pain and palpitations.   Gastrointestinal:  Negative for blood in stool, constipation, diarrhea and vomiting.   Endocrine: Negative for polydipsia and polyuria.   Genitourinary:  Negative for difficulty urinating, hematuria and urgency.    Musculoskeletal:  Negative for arthralgias, joint swelling and neck pain.   Neurological:  Negative for weakness and headaches.   Psychiatric/Behavioral:  Negative for confusion and dysphoric mood.        I personally reviewed Past Medical History, Past Surgical History, Social History, and Family History.    Objective:      There were no vitals filed for this visit.     Physical Exam  Constitutional:       General: He is not in acute distress.     Appearance: He is well-developed. He is not ill-appearing or diaphoretic.   HENT:      Head: Normocephalic.   Eyes:      General: No scleral icterus.        Right eye: No discharge.         Left eye: No discharge.   Pulmonary:      Effort: Pulmonary effort is normal. No respiratory distress.   Skin:     Coloration: Skin is not pale.      Findings: No erythema.   Neurological:      Mental Status: He is alert.   Psychiatric:         Behavior: Behavior normal.         Lab Results   Component Value Date    WBC 6.15 01/24/2024    HGB 15.3 01/24/2024    HCT 47.0 01/24/2024     01/24/2024    CHOL 113 (L) 01/24/2024    TRIG 62 01/24/2024    HDL 37 (L) 01/24/2024    ALT 14 01/24/2024    AST 25 01/24/2024     01/24/2024    K 4.8 01/24/2024     01/24/2024    CREATININE 1.1 01/24/2024    BUN 15 01/24/2024    CO2 27 01/24/2024    TSH 1.224 03/12/2020    HGBA1C 5.0 01/24/2024       The ASCVD Risk score (Earling DK, et al., 2019) failed to calculate for the following reasons:    The 2019 ASCVD risk score is only valid for ages 40 to 79    Assessment:       1. Nail biting    2. MILEY (generalized anxiety disorder)    3. Insomnia, unspecified type      Plan:       Travis was seen today for anxiety.    Diagnoses and all orders for this visit:    Nail biting  Comments:  Persistent. Related to anxiety as below. Adjusting SSRI. Cont therapy.  Orders:  -     Ambulatory referral/consult to Psychiatry; Future  -     paroxetine (PAXIL) 10 MG tablet; Take 1 tablet (10 mg total)  by mouth every morning. May increase dose based on response and tolerability in increments of 10 mg/day at intervals >1 week. Max dose 50 mg/day.  -     Discontinue: sertraline (ZOLOFT) 50 MG tablet; Take 0.5 tablets (25 mg total) by mouth once daily.  -     sertraline (ZOLOFT) 50 MG tablet; Take 0.5 tablets (25 mg total) by mouth once daily. for 7 days    MILEY (generalized anxiety disorder)  Comments:  Persistent. Discussed tx options, SE. Start low dose Paroxetine; may titrate. Decrease Zoloft to 25mg x 1 week, then d/c. Referred to Psychiatry.  Orders:  -     Ambulatory referral/consult to Psychiatry; Future  -     paroxetine (PAXIL) 10 MG tablet; Take 1 tablet (10 mg total) by mouth every morning. May increase dose based on response and tolerability in increments of 10 mg/day at intervals >1 week. Max dose 50 mg/day.  -     Discontinue: sertraline (ZOLOFT) 50 MG tablet; Take 0.5 tablets (25 mg total) by mouth once daily.  -     sertraline (ZOLOFT) 50 MG tablet; Take 0.5 tablets (25 mg total) by mouth once daily. for 7 days    Insomnia, unspecified type  Comments:  Doing well! Continue good sleep hygiene. Rx paroxetine for anxiety as above.  Orders:  -     Ambulatory referral/consult to Psychiatry; Future    Side effects of medication(s) were discussed in detail and patient voiced understanding.  Patient will call back for any issues or complications.     I have spent a total of 43 minutes with the patient as well as reviewing the chart/medical record and placing orders on the day of the visit. This includes face to face time and non-face to face time preparing to see the patient (eg, review of tests), obtaining and/or reviewing separately obtained history, documenting clinical information in the electronic or other health record, independently interpreting results and communicating results to the patient/family/caregiver, or care coordinator.    Visit today is associated with current or anticipated ongoing  medical care related to mental health.    RTC in 1 month(s) or sooner PRN for OCD, anxiety.  Virtual visit with me.

## 2024-09-23 NOTE — TELEPHONE ENCOUNTER
----- Message from Farideh Jerez MD sent at 9/23/2024 10:18 AM CDT -----  1. Please schedule a visit in 1 month(s) for anxiety, OCD. Virtual with me.  2. I ordered a referral to Psychiatry.  Please provide the patient with phone numbers to call and schedule. He can also call the number on the back of his insurance card to get a list of in network providers.  Thank you!

## 2024-09-23 NOTE — PATIENT INSTRUCTIONS
Cut Zoloft to 25mg for 1 week and then stop.    Call to make an appointment within [facility] for psychiatry/psychology [phone]. Can request to be placed on the waiting list in case of cancellations.  You can also call the number on the back of your insurance card to get a list of in network providers.      Other psychiatrists:  Mary Epps(psychiatrist) [address] [facility] [address] Phone: [phone]  Gage Pablo (psychiatrist) [phone], [phone]  [address] [place] [address]   Dr. Stephan Prieto - [phone]  Dr. Karely Matta - [phone]  Dr. Jenny Black - [phone]  Dr. Edy Shankar - [phone]    [facility]: [phone]    Therapy/Psychology:  You can try anyone of these number to see if your insurance is accepted or you would have to call your insurance.    [facility]al [facility] (CBT) [facility]  Address: [facility] [place] [address]  Phone: [phone]  [email or web address]    [facility] [address], [address]  Phone: [phone]  You can email for an appointment at: [email or web address]    [facility]   [address] [place] [address], [facility]  [email or web address]  Dr. Asha Meade, [phone] or [email or web address]   Dr. Mine Ogden, [phone] or [email or web address]     Barbara Marti LCSW (therapist) [phone]   [address] [place] [address]   Farideh King LCSW (therapist) [phone]   [address] [place] [address]   Alycia Pedroza LCSW (therapist) [phone]   [address] [place] [address]   SAMANTHA Pablo         LCSW                    [phone]  Humza Willson [phone] (therapist) [address] [facility]  Donell King (therapist) [phone]  [address] ([facility]) [phone] [address] [facility]     Alexandra Casalino, [facility]   [phone]     Afia García, [facility]   [phone]     Skylar Toth, [facility]   [phone]      Monalisa Macias, [facility]   [phone]     Humza John Chin, [facility]   [phone]     Skinny Bowers, [facility]   [phone]     Dr. Jorge Montaño, [facility]   [phone]     Merlene Pedro, [facility]   [phone]     Ivette Camacho, [facility]   [phone]     Lisandra Worthington, [facility]   [phone]     Nicho HERRON Lucio, [facility]   [phone]     Cintia Fajardon, [facility]   [phone]     Barbara Simmons, [facility]   [phone]     [facility] [phone]  [address] [address]    Employee Assistance Program (EAP)  Check through your employer's HR.    Online Therapist:    [email or web address]    Free Guided Meditations  [email or web address]  [email or web address]  [email or web address]     All of your core healthy metrics are met.      Ke Robles,     If you are due for any health screening(s) below please notify me so we can arrange them to be ordered and scheduled to maintain your health. Most healthy patients complete it. Don't lose out on improving your health.     All of your core healthy metrics are met.

## 2024-09-23 NOTE — TELEPHONE ENCOUNTER
You can come by and  a container.  I would not charge you to look at it unless you want an office visit as well.    Dr. Berrios

## 2024-09-27 ENCOUNTER — OFFICE VISIT (OUTPATIENT)
Dept: PSYCHIATRY | Facility: OTHER | Age: 34
End: 2024-09-27
Payer: COMMERCIAL

## 2024-09-27 DIAGNOSIS — F41.1 GAD (GENERALIZED ANXIETY DISORDER): Primary | ICD-10-CM

## 2024-09-27 NOTE — PROGRESS NOTES
The patient location is: [place] ([place])  The chief complaint leading to consultation is: f/u anxiety    Visit type: audiovisual    Face to Face time with patient: 50 mins  60 minutes of total time spent on the encounter, which includes face to face time and non-face to face time preparing to see the patient (eg, review of tests), Obtaining and/or reviewing separately obtained history, Documenting clinical information in the electronic or other health record, Independently interpreting results (not separately reported) and communicating results to the patient/family/caregiver, or Care coordination (not separately reported).         Each patient to whom he or she provides medical services by telemedicine is:  (1) informed of the relationship between the physician and patient and the respective role of any other health care provider with respect to management of the patient; and (2) notified that he or she may decline to receive medical services by telemedicine and may withdraw from such care at any time.    Type of service: Individual    Content of session: Pt states it's been a good week, feels wife's behavior has calmed down a bit. Notes they have agreed to see a couple's counselor, they should get in with her toward the middle to end of October. Notes wife also mentioned she might be ready for 1:1 therapy for herself. Notes wife is sleeping better, but still experiencing some anxiety, some robin sx, but also notes he is seeing some depression sx also. Pt notes his own anxiety is very responsive to things that she says or behaviors that he notices. Notes he is able to acknowledge the anxiety but is usually able to calm himself down and take deep breaths. Notes he is going for a lot of walks, finds this helps. Pt asks about what boundaries he should set with wife if she decides to take herself off of her medication. Pt notes saw PCP on Monday, is coming off Zoloft and is trying Paxil instead, notes he is hopeful that this  will help with his OCD sx (skin picking, nail biting, etc.), has an appt with psych at the end of October. Notes one of his wife's delusions right now is that she is pregnant, pt notes that he got a vasectomy over the summer, also dropped off another sample for further testing today, he's interested to see if that will help her beliefs. Notes his wife continues to shop a lot and pt is anxious about the financial implications of that. Overall, pt feeling better than last week, will f/u in 1 week.      Therapeutic techniques and approaches: behavior modification and supportive counseling. Rationale: appropriate for presenting issues.     Pt denies SI/HI. Mood was euthymic, affect matches verbal content. AAOx3, participated fully in session.     Time spent in counselinmins

## 2024-10-04 ENCOUNTER — OFFICE VISIT (OUTPATIENT)
Dept: PSYCHIATRY | Facility: OTHER | Age: 34
End: 2024-10-04
Payer: COMMERCIAL

## 2024-10-04 DIAGNOSIS — F41.1 GAD (GENERALIZED ANXIETY DISORDER): Primary | ICD-10-CM

## 2024-10-04 NOTE — PROGRESS NOTES
"The patient location is: Home ([facility])  The chief complaint leading to consultation is: f/u anxiety    Visit type: audiovisual    Face to Face time with patient: 50 mins  60 minutes of total time spent on the encounter, which includes face to face time and non-face to face time preparing to see the patient (eg, review of tests), Obtaining and/or reviewing separately obtained history, Documenting clinical information in the electronic or other health record, Independently interpreting results (not separately reported) and communicating results to the patient/family/caregiver, or Care coordination (not separately reported).         Each patient to whom he or she provides medical services by telemedicine is:  (1) informed of the relationship between the physician and patient and the respective role of any other health care provider with respect to management of the patient; and (2) notified that he or she may decline to receive medical services by telemedicine and may withdraw from such care at any time.    Type of service: Individual    Content of session: Pt notes things are on the whole better, wife has been doing ok. Her parents are here right now. Pt discussed his own anxiety and his relationship with his Mom, who is drinking again. We discussed what he can control in this situation, "I feel really sad about the whole thing". "I want to get to the point where things are normal again", feeling really frustrated that his wife can't see the reality of the situation. "It's just very frustrating to have no control over any of it". Discussed how he responds to his anxiety about it in the moment, discussed adjusting expectations. Notes he is feeling grief about relationships with both is wife and his Mom. Discussed anxiety mgmt. Notes he is planning a trip to [place] over Thanksgiving for a talk and will bring his family with him, discussed having a Plan B for that. Pt feels he and wife have been able to rebuild trust a bit, " notes he is continuing to use the LEAP method, is not trying to reason with her and finds that this is making things more peaceful. Discussed coping mechanisms, overall pt coping well with multiple stressors. Will f/u in 1 week.      Therapeutic techniques and approaches: behavior modification and supportive counseling. Rationale: appropriate for presenting issues.     Pt denies SI/HI. Mood was euthymic, affect matches verbal content. AAOx3, participated fully in session.     Time spent in counselinmins        ED

## 2024-10-11 ENCOUNTER — OFFICE VISIT (OUTPATIENT)
Dept: PSYCHIATRY | Facility: OTHER | Age: 34
End: 2024-10-11
Payer: COMMERCIAL

## 2024-10-11 DIAGNOSIS — F41.1 GAD (GENERALIZED ANXIETY DISORDER): Primary | ICD-10-CM

## 2024-10-11 PROCEDURE — 90837 PSYTX W PT 60 MINUTES: CPT | Mod: 95,,, | Performed by: SOCIAL WORKER

## 2024-10-11 PROCEDURE — 3044F HG A1C LEVEL LT 7.0%: CPT | Mod: CPTII,95,, | Performed by: SOCIAL WORKER

## 2024-10-11 NOTE — PROGRESS NOTES
"The patient location is: [place], [place]  The chief complaint leading to consultation is: f/u anxiety    Visit type: audiovisual    Face to Face time with patient: 50 mins  60 minutes of total time spent on the encounter, which includes face to face time and non-face to face time preparing to see the patient (eg, review of tests), Obtaining and/or reviewing separately obtained history, Documenting clinical information in the electronic or other health record, Independently interpreting results (not separately reported) and communicating results to the patient/family/caregiver, or Care coordination (not separately reported).         Each patient to whom he or she provides medical services by telemedicine is:  (1) informed of the relationship between the physician and patient and the respective role of any other health care provider with respect to management of the patient; and (2) notified that he or she may decline to receive medical services by telemedicine and may withdraw from such care at any time.    Type of service: Individual    Content of session: Pt states things are good, wife's parents have been here this week. Pt notes his etoh consumption has been "creeping up" in the last few weeks, notes he is trying to be more mindful of it as he notes it impacts his sleep and anxiety. Notes he is not drinking now, hasn't been for 3-4 days. Notes he felt shame and guilt when he realized the drinking had increased. "I don't feel like I've ever really dealt with depression but in that moment that's sort of what it felt like, those feelings of doom and shame". Notes his social anxiety has eased up, notes he is meeting visiting speakers and doesn't feel anxious about it, denies social anxiety in the classroom. Notes his self-concept has improved in the last few days, was feeling really badly about himself when he realized how much he was drinking, but notes he is feeling better about himself now as a result of stopping. " Notes there is a strong genetic component in his family of addiction, notes his Mom is an alcoholic, her father and all of his brothers all  from addiction. Notes he is continuing to exercise and do explosive movements in the moment when he is anxious. Discussed sleep hygiene at length, discussed how his epilepsy impacts his sleep schedule. Overall, pt coping well, will f/u in 1 week.      Therapeutic techniques and approaches: behavior modification and supportive counseling. Rationale: appropriate for presenting issues.     Pt denies SI/HI. Mood was euthymic, affect matches verbal content. AAOx3, participated fully in session.     Time spent in counselinmins

## 2024-10-18 ENCOUNTER — OFFICE VISIT (OUTPATIENT)
Dept: PSYCHIATRY | Facility: OTHER | Age: 34
End: 2024-10-18
Payer: COMMERCIAL

## 2024-10-18 DIAGNOSIS — F41.1 GAD (GENERALIZED ANXIETY DISORDER): Primary | ICD-10-CM

## 2024-10-18 PROCEDURE — 3044F HG A1C LEVEL LT 7.0%: CPT | Mod: CPTII,95,, | Performed by: SOCIAL WORKER

## 2024-10-18 PROCEDURE — 90837 PSYTX W PT 60 MINUTES: CPT | Mod: 95,,, | Performed by: SOCIAL WORKER

## 2024-10-18 NOTE — PROGRESS NOTES
"The patient location is: Home ([facility])  The chief complaint leading to consultation is: f/u anxiety    Visit type: audiovisual    Face to Face time with patient: 50 mins  60 minutes of total time spent on the encounter, which includes face to face time and non-face to face time preparing to see the patient (eg, review of tests), Obtaining and/or reviewing separately obtained history, Documenting clinical information in the electronic or other health record, Independently interpreting results (not separately reported) and communicating results to the patient/family/caregiver, or Care coordination (not separately reported).         Each patient to whom he or she provides medical services by telemedicine is:  (1) informed of the relationship between the physician and patient and the respective role of any other health care provider with respect to management of the patient; and (2) notified that he or she may decline to receive medical services by telemedicine and may withdraw from such care at any time.    Type of service: Individual    Content of session: Pt states he is continuing to cut out alcohol but is continuing to have trouble sleeping, notes falling asleep is fine but is having trouble staying asleep. Notes wife had her psych appt on Wednesday, feels it went well, notes their communication is more open with each other, this has helped his own anxiety about her. Notes his anxiety remains high. He has his own psych appt next week and notes he and wife have their initial consult with Margarita Castillo as well. Notes he is feeling anxiety about the meeting, discussed what he can expect. Notes he has been on Paxil for 2 weeks, has noticed a reduction in fixations and ruminations, "I feel like I can shut down intrusive thoughts more quickly". Notes there have been 2 times in the past 2 weeks where he has a nagging feeling that he's forgotten something but he is able to let it go, something that he had previously been " unable to do. Overall, pt coping better, feeling less anxious overall. Will f/u in 3 weeks.      Therapeutic techniques and approaches: behavior modification and supportive counseling. Rationale: appropriate for presenting issues.     Pt denies SI/HI. Mood was euthymic, affect matches verbal content. AAOx3, participated fully in session.     Time spent in counselinmins

## 2024-10-21 ENCOUNTER — TELEPHONE (OUTPATIENT)
Dept: PSYCHIATRY | Facility: CLINIC | Age: 34
End: 2024-10-21
Payer: COMMERCIAL

## 2024-10-21 ENCOUNTER — PATIENT MESSAGE (OUTPATIENT)
Dept: PSYCHIATRY | Facility: CLINIC | Age: 34
End: 2024-10-21
Payer: COMMERCIAL

## 2024-10-24 ENCOUNTER — OFFICE VISIT (OUTPATIENT)
Dept: INTERNAL MEDICINE | Facility: CLINIC | Age: 34
End: 2024-10-24
Payer: COMMERCIAL

## 2024-10-24 DIAGNOSIS — F98.8 NAIL BITING: Primary | ICD-10-CM

## 2024-10-24 DIAGNOSIS — G47.00 INSOMNIA, UNSPECIFIED TYPE: ICD-10-CM

## 2024-10-24 DIAGNOSIS — F41.1 GAD (GENERALIZED ANXIETY DISORDER): ICD-10-CM

## 2024-10-24 PROCEDURE — 1160F RVW MEDS BY RX/DR IN RCRD: CPT | Mod: CPTII,95,, | Performed by: INTERNAL MEDICINE

## 2024-10-24 PROCEDURE — 99214 OFFICE O/P EST MOD 30 MIN: CPT | Mod: 95,,, | Performed by: INTERNAL MEDICINE

## 2024-10-24 PROCEDURE — G2211 COMPLEX E/M VISIT ADD ON: HCPCS | Mod: 95,,, | Performed by: INTERNAL MEDICINE

## 2024-10-24 PROCEDURE — 1159F MED LIST DOCD IN RCRD: CPT | Mod: CPTII,95,, | Performed by: INTERNAL MEDICINE

## 2024-10-24 PROCEDURE — 3044F HG A1C LEVEL LT 7.0%: CPT | Mod: CPTII,95,, | Performed by: INTERNAL MEDICINE

## 2024-10-24 RX ORDER — PAROXETINE HYDROCHLORIDE 20 MG/1
20 TABLET, FILM COATED ORAL DAILY
Qty: 30 TABLET | Refills: 11 | Status: SHIPPED | OUTPATIENT
Start: 2024-10-24 | End: 2025-10-24

## 2024-10-24 NOTE — PROGRESS NOTES
The patient location is: [place]  The chief complaint leading to consultation is: Anxiety  Visit type: Virtual visit with synchronous audio and video  Contact time spent with patient: 12 minutes  Each patient to whom he or she provides medical services by telemedicine is:  (1) informed of the relationship between the physician and patient and the respective role of any other health care provider with respect to management of the patient; and (2) notified that he or she may decline to receive medical services by telemedicine and may withdraw from such care at any time.  Subjective:       Patient ID: Travis Bennett is a 34 y.o. male who  has a past medical history of Epilepsy.    Chief Complaint: Anxiety     History was obtained from the patient and supplemented through chart review.  He is a patient of Michelle Foreamn MD.  Was last seen  for annual.    Moved here from [place].  Grad school at [employer].  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  He is classically trained in piano, sings, electronic music.  His wife is Alyce Bennett.  They have 2 children.    HPI     Nail biting, OCD:  Has been present for all of his life.  Anxiety as below. Compulsion. No other repetitive activities. Tried nail polish with some relief.  Triggers: will unconsciously do this when lost in thought, work. Has anxiety and perseverating thoughts; feels that he's forgetting something.  Has caused some teeth chipping.  Has some jaw pain. Wears a mouth guard. Eats ice.  No ice craving.    Has stress at home.  His wife was admitted for bipolar disorder, robin. His wife saw Psychiatry.  They are interested in couple's therapy.    He is following c Dr. Thu Chairez.  Therapy has been helpful, but has plateaued and has persistent nail biting.   Has appointment with Psychiatry next month.    MILEY:  Chronic. Started in middle school; moved a lot at the time. Social anxiety.  Exercise helps.   Following c therapy as above.     FHx:  Mom is on Paxil/Paroxetine, which was  helpful for her.     Previous meds:   -Trazodone caused priaprism.   -Was on Zoloft since 2022.  Zoloft 100 mg caused restless legs, decreased libido.  Did not notice much benefit with 100 mg.  50 mg helped with overall anxiety, but persistent OCD.  Denies side effects.      Tapered off Zoloft and started paroxetine  due to persistent anxiety and history of insomnia.  He is currently taking 10mg.  He feels a reduction of anxiety. Helps to shut down intrusive thoughts.  Effect feels similar to Zoloft.   Denies side effects, such as GI upset, insomnia, weight gain, or impaired sexual function.      Mood:  Mainly anxiety.  Doesn't feel depression.  Sleep:  Difficulty with sleep latency as below  Energy: good  Concentration: can perseverate that he's forgotten something.  Appetite: Had lost 10 lbs d/t decreased PO intake d/t stress. Gained 5 lbs back.   Snacks, Cheez Its at times, more so when stressed.  Trying to have healthier snacks at home.  Not a big problem with diet, appetite.     Insomnia:  Was rx'd Trazodone p.r.n. for sleep in the past, but caused priaprism.   Has improved sleep hygiene.    No big effect on sleep with paroxetine, but also has stress.  Difficulty staying asleep, sleep latency. Wakes up at 3 AM and has difficulty falling back asleep.  Finds himself over thinking.  He cut out ETOH entirely.  He has been talking to therapy about this.  Discussed sleep hygiene.              Not addressed today.  IBS with diarrhea:  Upset stomach prior to BM.  Since middle school. Related to anxiety.  No change when cutting out dairy, grain.  Varies. Associated with anxiety.     Epilepsy:  +FHx.  Since college.  Occurred during an exam.  Tonic clonic.  Resulted in left shoulder dislocation.  Was unable to taper off AEDs d/t recurrence.  Last seizure 8/2017 d/t missed doses. Is on Keppra BID.  Controlled. Cont Keppra BID. F/u with Neurology.      Review of Systems   Constitutional:  Negative for activity  change and unexpected weight change.   HENT:  Negative for hearing loss, rhinorrhea and trouble swallowing.    Eyes:  Negative for discharge and visual disturbance.   Respiratory:  Negative for chest tightness and wheezing.    Cardiovascular:  Negative for chest pain and palpitations.   Gastrointestinal:  Negative for blood in stool, constipation, diarrhea and vomiting.   Endocrine: Negative for polydipsia and polyuria.   Genitourinary:  Negative for difficulty urinating, hematuria and urgency.   Musculoskeletal:  Negative for arthralgias, joint swelling and neck pain.   Neurological:  Negative for weakness and headaches.   Psychiatric/Behavioral:  Negative for confusion and dysphoric mood.        I personally reviewed Past Medical History, Past Surgical History, Social History, and Family History.    Objective:      There were no vitals filed for this visit.     Physical Exam  Constitutional:       General: He is not in acute distress.     Appearance: He is well-developed. He is not ill-appearing or diaphoretic.   HENT:      Head: Normocephalic.   Eyes:      General: No scleral icterus.        Right eye: No discharge.         Left eye: No discharge.   Pulmonary:      Effort: Pulmonary effort is normal. No respiratory distress.   Skin:     Coloration: Skin is not pale.      Findings: No erythema.   Neurological:      Mental Status: He is alert.   Psychiatric:         Behavior: Behavior normal.         Lab Results   Component Value Date    WBC 6.15 01/24/2024    HGB 15.3 01/24/2024    HCT 47.0 01/24/2024     01/24/2024    CHOL 113 (L) 01/24/2024    TRIG 62 01/24/2024    HDL 37 (L) 01/24/2024    ALT 14 01/24/2024    AST 25 01/24/2024     01/24/2024    K 4.8 01/24/2024     01/24/2024    CREATININE 1.1 01/24/2024    BUN 15 01/24/2024    CO2 27 01/24/2024    TSH 1.224 03/12/2020    HGBA1C 5.0 01/24/2024       The ASCVD Risk score (Adarsh DK, et al., 2019) failed to calculate for the following  reasons:    The 2019 ASCVD risk score is only valid for ages 40 to 79    Assessment:       1. Nail biting    2. MILEY (generalized anxiety disorder)    3. Insomnia, unspecified type      Plan:       Travis was seen today for anxiety.    Diagnoses and all orders for this visit:    Nail biting  Comments:  Persistent. Related to anxiety as below.  Titrating SSRI. Cont therapy.  Orders:  -     paroxetine (PAXIL) 20 MG tablet; Take 1 tablet (20 mg total) by mouth once daily. May increase dose based on response and tolerability in increments of 10 mg/day at intervals >1 week. Max dose 50 mg/day.    MILEY (generalized anxiety disorder)  Comments:  Slight improvement, but not yet controlled. Increase Paroxetine to 20mg; may titrate. Has appointment with Psychiatry next mo.  Orders:  -     paroxetine (PAXIL) 20 MG tablet; Take 1 tablet (20 mg total) by mouth once daily. May increase dose based on response and tolerability in increments of 10 mg/day at intervals >1 week. Max dose 50 mg/day.    Insomnia, unspecified type  Comments:  Persistent.  Discussed sleep hygiene, avoid ETOH. Rx paroxetine for anxiety and sleep as above.  Continue therapy.  Orders:  -     paroxetine (PAXIL) 20 MG tablet; Take 1 tablet (20 mg total) by mouth once daily. May increase dose based on response and tolerability in increments of 10 mg/day at intervals >1 week. Max dose 50 mg/day.    Side effects of medication(s) were discussed in detail and patient voiced understanding.  Patient will call back for any issues or complications.     Visit today is associated with current or anticipated ongoing medical care related to mental health.    RTC PRN.  Keep visit with PCP  for annual.

## 2024-11-05 ENCOUNTER — OFFICE VISIT (OUTPATIENT)
Dept: PSYCHIATRY | Facility: OTHER | Age: 34
End: 2024-11-05
Payer: COMMERCIAL

## 2024-11-05 DIAGNOSIS — F41.1 GAD (GENERALIZED ANXIETY DISORDER): Primary | ICD-10-CM

## 2024-11-05 PROCEDURE — 3044F HG A1C LEVEL LT 7.0%: CPT | Mod: CPTII,95,, | Performed by: SOCIAL WORKER

## 2024-11-05 PROCEDURE — 90837 PSYTX W PT 60 MINUTES: CPT | Mod: 95,,, | Performed by: SOCIAL WORKER

## 2024-11-05 NOTE — PROGRESS NOTES
"The patient location is: Home [place])  The chief complaint leading to consultation is: f/u anxiety    Visit type: audiovisual    Face to Face time with patient: 50 mins  60 minutes of total time spent on the encounter, which includes face to face time and non-face to face time preparing to see the patient (eg, review of tests), Obtaining and/or reviewing separately obtained history, Documenting clinical information in the electronic or other health record, Independently interpreting results (not separately reported) and communicating results to the patient/family/caregiver, or Care coordination (not separately reported).         Each patient to whom he or she provides medical services by telemedicine is:  (1) informed of the relationship between the physician and patient and the respective role of any other health care provider with respect to management of the patient; and (2) notified that he or she may decline to receive medical services by telemedicine and may withdraw from such care at any time.    Type of service: Individual    Content of session: Pt states things are going well overall. Notes "it's still a day by day sort of thing". Psychiatry appt got cancelled, rescheduled for 11/18. Did see PCP in the meantime, Paxil was increased to 20mg 9 days ago. Notes he is doing "the mindfulness workbook for OCD", states it's been really helpful so far. Notes he's realizing how many different manifestations of OCD he has experienced over time. Notes he has experienced "existential OCD" in addition to relationship OCD. Notes there was a curve in his kitchen floor last year and he could not stop measuring it, eventually ended up ripping up the floor and laying tile down on it. "All of these examples of these irrational and intrusive thoughts that led to this unhealthy list making and checking". Is learning how to be more mindful with his thoughts. Endorses continued trouble falling asleep and staying asleep. Pt notes he " and wife decided not to do couple's therapy, states wife decided it was not the right time for it, pt notes he was relieved by this. Pt notes he was really pushing for his wife to go to individual therapy but states she is not willing to go, he feels like it's ok to focus on medication and stability first. States his wife feels it's important for her to get a dx that does not come from her inpt stay. Notes there is no fighting at home, things feel stable. Notes tomorrow will be a month since he had any alcohol. Is continuing to practice sleep hygiene. Discussed gender dynamics in this situation with his wife. Overall, pt coping well, will f/u in 2 weeks.      Therapeutic techniques and approaches: behavior modification and supportive counseling. Rationale: appropriate for presenting issues.     Pt denies SI/HI. Mood was euthymic, affect matches verbal content. AAOx3, participated fully in session.     Time spent in counselinmins

## 2024-11-15 ENCOUNTER — OFFICE VISIT (OUTPATIENT)
Dept: PSYCHIATRY | Facility: OTHER | Age: 34
End: 2024-11-15
Payer: COMMERCIAL

## 2024-11-15 DIAGNOSIS — F41.1 GAD (GENERALIZED ANXIETY DISORDER): Primary | ICD-10-CM

## 2024-11-15 PROCEDURE — 90837 PSYTX W PT 60 MINUTES: CPT | Mod: 95,,, | Performed by: SOCIAL WORKER

## 2024-11-15 PROCEDURE — 3044F HG A1C LEVEL LT 7.0%: CPT | Mod: CPTII,95,, | Performed by: SOCIAL WORKER

## 2024-11-15 NOTE — PROGRESS NOTES
"The patient location is: Home ([facility])  The chief complaint leading to consultation is: f/u anxiety, OCD    Visit type: audiovisual    Face to Face time with patient: 50 mins  60 minutes of total time spent on the encounter, which includes face to face time and non-face to face time preparing to see the patient (eg, review of tests), Obtaining and/or reviewing separately obtained history, Documenting clinical information in the electronic or other health record, Independently interpreting results (not separately reported) and communicating results to the patient/family/caregiver, or Care coordination (not separately reported).         Each patient to whom he or she provides medical services by telemedicine is:  (1) informed of the relationship between the physician and patient and the respective role of any other health care provider with respect to management of the patient; and (2) notified that he or she may decline to receive medical services by telemedicine and may withdraw from such care at any time.    Type of service: Individual    Content of session: "It's been a roller coaster of a week", notes his Mom sent a package to his kids and this really triggered his wife as she had asked his Mom for space. This caused his wife to feel very depressed, they had a discussion about robin and the downward turn to depression. Notes her mood has continued to be poor all week, pt notes she did ask him to go with her to her next psych appt which is a big step. "I think there is something about the diagnosis of bipolar disorder that it's a hard pill to swallow for her". Pt talked about his own anxiety and how this has impacted his relationship with his wife, pt was able to advocate for himself with her last night and remind her that he is working on his own anxiety, notes things did get better after he mentioned that. Pt notes that this has impacted their kids a lot, especially his older daughter, this has been very hard for " "pt to witness. 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 Overall, pt coping well, will f/u in 2 weeks.      Therapeutic techniques and approaches: behavior modification and supportive counseling. Rationale: appropriate for presenting issues.     Pt denies SI/HI. Mood was euthymic, affect matches verbal content. AAOx3, participated fully in session.     Time spent in counselinmins         "

## 2024-11-18 ENCOUNTER — OFFICE VISIT (OUTPATIENT)
Dept: PSYCHIATRY | Facility: CLINIC | Age: 34
End: 2024-11-18
Payer: COMMERCIAL

## 2024-11-18 VITALS
HEART RATE: 65 BPM | DIASTOLIC BLOOD PRESSURE: 59 MMHG | HEIGHT: 68 IN | SYSTOLIC BLOOD PRESSURE: 112 MMHG | BODY MASS INDEX: 22.99 KG/M2 | WEIGHT: 151.69 LBS

## 2024-11-18 DIAGNOSIS — F98.8 NAIL BITING: ICD-10-CM

## 2024-11-18 DIAGNOSIS — G47.00 INSOMNIA, UNSPECIFIED TYPE: ICD-10-CM

## 2024-11-18 DIAGNOSIS — F41.1 GAD (GENERALIZED ANXIETY DISORDER): ICD-10-CM

## 2024-11-18 PROCEDURE — 3074F SYST BP LT 130 MM HG: CPT | Mod: CPTII,S$GLB,,

## 2024-11-18 PROCEDURE — 99205 OFFICE O/P NEW HI 60 MIN: CPT | Mod: S$GLB,,,

## 2024-11-18 PROCEDURE — 3078F DIAST BP <80 MM HG: CPT | Mod: CPTII,S$GLB,,

## 2024-11-18 PROCEDURE — 3044F HG A1C LEVEL LT 7.0%: CPT | Mod: CPTII,S$GLB,,

## 2024-11-18 PROCEDURE — 3008F BODY MASS INDEX DOCD: CPT | Mod: CPTII,S$GLB,,

## 2024-11-18 PROCEDURE — 99999 PR PBB SHADOW E&M-EST. PATIENT-LVL III: CPT | Mod: PBBFAC,,,

## 2024-11-18 RX ORDER — PAROXETINE 30 MG/1
30 TABLET, FILM COATED ORAL EVERY MORNING
Qty: 30 TABLET | Refills: 2 | Status: SHIPPED | OUTPATIENT
Start: 2024-11-18
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
## 2024-11-21 NOTE — PROGRESS NOTES
[facility] Psychiatry resident Kali Guerra MD  discussed his assessment and plan for this case.  As well, I also went to see pt myself and reviewed and concur with resident note, assessment, and plan.    Theo Neff MD [facility]  Attending Psychiatrist   [facility]

## 2024-12-02 ENCOUNTER — OFFICE VISIT (OUTPATIENT)
Dept: PSYCHIATRY | Facility: OTHER | Age: 34
End: 2024-12-02
Payer: COMMERCIAL

## 2024-12-02 DIAGNOSIS — F41.1 GAD (GENERALIZED ANXIETY DISORDER): Primary | ICD-10-CM

## 2024-12-02 PROCEDURE — 90837 PSYTX W PT 60 MINUTES: CPT | Mod: 95,,, | Performed by: SOCIAL WORKER

## 2024-12-02 PROCEDURE — 3044F HG A1C LEVEL LT 7.0%: CPT | Mod: CPTII,95,, | Performed by: SOCIAL WORKER

## 2024-12-02 NOTE — PROGRESS NOTES
"The patient location is: Home ([place])  The chief complaint leading to consultation is: f/u MILEY    Visit type: audiovisual    Face to Face time with patient: 50 mins  60 minutes of total time spent on the encounter, which includes face to face time and non-face to face time preparing to see the patient (eg, review of tests), Obtaining and/or reviewing separately obtained history, Documenting clinical information in the electronic or other health record, Independently interpreting results (not separately reported) and communicating results to the patient/family/caregiver, or Care coordination (not separately reported).         Each patient to whom he or she provides medical services by telemedicine is:  (1) informed of the relationship between the physician and patient and the respective role of any other health care provider with respect to management of the patient; and (2) notified that he or she may decline to receive medical services by telemedicine and may withdraw from such care at any time.    Type of service: Individual    Content of session: Went on a trip with family from [place] to [place], was there for a few days. States it's been a really good few weeks since we last spoke, has been keeping a joint mood journal with his wife and finds that this has been helpful for him. Wife has an appt with psych on Thursday that she has asked him to attend. He has started to talk to her more about how her manic episode impacted him, how this increased his anxiety and fear. Notes he has had more anxiety this week as it's the final week in the semester, is working on revisions of a few papers, notes he always gets anxiety after returning from a trip. Notes some anxiety with feeling like he's missed something, "I feel like I have a responsibility to not let anyone down, it's a duty". Discussed him internalizing other people's feelings and his "extreme risk-aversion", discussed what he does to avoid making people poorly "because " "then I'll feel poorly", discussed his perfectionism and how this impacts his anxiety. Had psych appt and also discussed his fear of missing something, his obsessions. Notes they upped his Paxil to 30 mg, they discussed that the medication needs to be high to manage OCD, they may not be able to manage it with medicine, rather though CBT. Notes his mood is a little better, notes social anxiety in meetings today was much better managed. Will f/u in 3 weeks.    Therapeutic techniques and approaches: behavior modification and supportive counseling. Rationale: appropriate for presenting issues.     Pt denies SI/HI. Mood was euthymic, affect matches verbal content. AAOx3, participated fully in session.     Time spent in counselinmins       "

## 2024-12-16 ENCOUNTER — PATIENT MESSAGE (OUTPATIENT)
Dept: PSYCHIATRY | Facility: OTHER | Age: 34
End: 2024-12-16
Payer: COMMERCIAL

## 2024-12-30 ENCOUNTER — OFFICE VISIT (OUTPATIENT)
Dept: PSYCHIATRY | Facility: OTHER | Age: 34
End: 2024-12-30
Payer: COMMERCIAL

## 2024-12-30 DIAGNOSIS — F41.1 GAD (GENERALIZED ANXIETY DISORDER): Primary | ICD-10-CM

## 2024-12-30 PROCEDURE — 90837 PSYTX W PT 60 MINUTES: CPT | Mod: 95,,, | Performed by: SOCIAL WORKER

## 2024-12-30 NOTE — PROGRESS NOTES
"The patient location is: Home ([place])  The chief complaint leading to consultation is: f/u anxiety    Visit type: audiovisual    Face to Face time with patient: 50 mins  60 minutes of total time spent on the encounter, which includes face to face time and non-face to face time preparing to see the patient (eg, review of tests), Obtaining and/or reviewing separately obtained history, Documenting clinical information in the electronic or other health record, Independently interpreting results (not separately reported) and communicating results to the patient/family/caregiver, or Care coordination (not separately reported).         Each patient to whom he or she provides medical services by telemedicine is:  (1) informed of the relationship between the physician and patient and the respective role of any other health care provider with respect to management of the patient; and (2) notified that he or she may decline to receive medical services by telemedicine and may withdraw from such care at any time.    Type of service: Individual    Content of session: Pt states things are good, notes wife is in a depressive episode but notes it's mild. Notes she has gotten to a point of acceptance with needing to be on medication, pt notes it's a relief for him knowing she's reaching that point of acceptance. Pt notes he continues to read up on her conditions and finds that framing it as a biological/neurological disorder has been helpful to her. Pt states he will be going to all of her appts with her now at her request, is only seeing her psychiatrist and not yet doing therapy. Pt notes he is sleeping well, still no drinking, feels his anxiety is much better managed. Notes he is doing breathing meditations at bedtime and notes they help a lot. Notes he is trying a decoupling technique, if he wants to bite his nails he will do another behavior first, unsure if it's helping. "It feels like there is a web of intrusive thoughts that are " "all related to it". Doesn't feel like nail-biting has changed at all. Still on Paxil, notes he's noticed less baseline anxiety but is still having anxiety in certain circumstances. "I border on feeling content", notes he doesn't want to increase further bc he's noticing "foot jerks" at night. Discussed myoclonic jerks, yoga as a way to manage the jerks. Notes the nail biting is a big stressor for him right now as it's starting to impact his dental health, but notes overall he feels things are going well. Notes tenure review is coming up and this is very stressful for him. Things with Mom and step-Dad are not going well, notes things with Dad are going well. Overall, pt coping well, will f/u in 2 weeks.      Therapeutic techniques and approaches: behavior modification and supportive counseling. Rationale: appropriate for presenting issues.     Pt denies SI/HI. Mood was euthymic, affect matches verbal content. AAOx3, participated fully in session.     Time spent in counselinmins       "

## 2025-01-13 ENCOUNTER — OFFICE VISIT (OUTPATIENT)
Dept: PSYCHIATRY | Facility: CLINIC | Age: 35
End: 2025-01-13
Payer: COMMERCIAL

## 2025-01-13 DIAGNOSIS — F41.1 GAD (GENERALIZED ANXIETY DISORDER): ICD-10-CM

## 2025-01-13 DIAGNOSIS — G47.00 INSOMNIA, UNSPECIFIED TYPE: ICD-10-CM

## 2025-01-13 DIAGNOSIS — F98.8 NAIL BITING: Primary | ICD-10-CM

## 2025-01-13 PROCEDURE — 99214 OFFICE O/P EST MOD 30 MIN: CPT | Mod: S$GLB,,,

## 2025-01-13 NOTE — PROGRESS NOTES
"OUTPATIENT PSYCHIATRY FOLLOW UP VISIT    ENCOUNTER DATE:  1/13/2025  SITE:  [facility]  LENGTH OF SESSION:  30 minutes      CHIEF COMPLAINT:      HISTORY OF PRESENTING ILLNESS:  Travis Bennett is a 34 y.o. male with history of Anxiety, Insomnia and concern for OCD  who presents for follow up appointment.      Plan at last appointment on 11/18/2024:  Increase Paxil to 30 mg daily  Counseled on Sleep Hygiene and encouraged to download CBTi  ted  Will plan for Y-BOCS at next visit for further evaluation   Continue therapy with Trixie Chairez LCSW      Interval history as told by Patient - & or family/friend/spouse/caregiver with pts permission      States he has been doing "pretty good." Says his wife is doing well and things are "much better" at home. Says that this has overall helped with stress and anxiety.  Continues to meet with therapy every other week which he says has helped for overall stress and anxiety, and he feels that his baseline anxiety is very low. Also reports good mood, with no major depressive symptoms. Does report ongoing issues with intrusive thoughts and nail biting, which he says is exacerbated by stress levels. States he has noticed little change with this since increasing Paxil. Says work is picking back up again, which has caused slight increase in stress and subsequent increased in nail biting and intrusive thoughts. He reports that since increase in Paxil, he has noticed return of "myoclonic jerks" in LE. Says he has noticed this primarily when he takes medication at night. He states he noticed improvement in this with stretching. Also reports decreased libido and difficulty reaching orgasm, though is not as bothered by this. Reports good sleep and appetite. Denies SI or thoughts of self harm. No HI/AVH or paranoia. Otherwise no further complaints.     PSYCHIATRIC REVIEW OF SYSTEMS:(none/ yes- better/worse/stable/& what symptoms)    Symptoms of Depression: " "stable    Symptoms of Anxiety/ panic attacks: improved     Symptoms of Joan/Hypomania: none    Symptoms of psychosis: none    Sleep: stable    Appetite: none    Other: none    Alcohol: stable    Illicit Drugs: none    Psychosocial stressors: work    Risk Parameters:  Patient reports no suicidal ideation  Patient reports no homicidal ideation  Patient reports no self-injurious behavior  Patient reports no violent behavior    PSYCHIATRIC MED REVIEW    Current psych meds  Medication side effects:  "myoclonic jerks," decreased libido, trouble reaching orgasm   Medication compliance:  yes    Previous psych meds trials   Zoloft, Trazodone     PAST PSYCHIATRIC, MEDICAL, AND SOCIAL HISTORY REVIEWED  The patient's past medical, family and social history have been reviewed and updated as appropriate within the electronic medical record - see encounter notes.    MEDICAL REVIEW OF SYSTEMS:  Complete review of systems performed covering Constitutional, Musculoskeletal, Neurologic.  All systems negative.    ALL MEDICATIONS:    Current Outpatient Medications:     levetiracetam XR (KEPPRA XR) 750 mg Tb24 tablet, Take 1 tablet (750 mg total) by mouth 2 (two) times daily., Disp: 180 tablet, Rfl: 3    loratadine (CLARITIN) 10 mg tablet, Take 10 mg by mouth daily as needed., Disp: , Rfl:     paroxetine (PAXIL) 30 MG tablet, Take 1 tablet (30 mg total) by mouth every morning., Disp: 30 tablet, Rfl: 2    ALLERGIES:  Review of patient's allergies indicates:  No Known Allergies    RELEVANT LABS/STUDIES:    Lab Results   Component Value Date    WBC 6.15 01/24/2024    HGB 15.3 01/24/2024    HCT 47.0 01/24/2024    MCV 90 01/24/2024     01/24/2024     BMP  Lab Results   Component Value Date     01/24/2024    K 4.8 01/24/2024     01/24/2024    CO2 27 01/24/2024    BUN 15 01/24/2024    CREATININE 1.1 01/24/2024    CALCIUM 10.5 01/24/2024    ANIONGAP 8 01/24/2024    ESTGFRAFRICA >60 03/12/2020    EGFRNONAA >60 03/12/2020 " "    Lab Results   Component Value Date    ALT 14 01/24/2024    AST 25 01/24/2024    ALKPHOS 51 (L) 01/24/2024    BILITOT 0.4 01/24/2024     Lab Results   Component Value Date    TSH 1.224 03/12/2020     Lab Results   Component Value Date    HGBA1C 5.0 01/24/2024       VITALS  There were no vitals filed for this visit.    PHYSICAL EXAM  General: well developed, well nourished  Neurologic:   Gait: Normal   Psychomotor signs:  No involuntary movements or tremor  AIMS: none    PSYCHIATRIC EXAM:     Mental Status Exam:  Appearance: unremarkable, age appropriate  Behavior/Cooperation: normal, cooperative  Speech: normal tone, normal rate, normal pitch, normal volume  Language: uses words appropriately; NO aphasia or dysarthria  Mood: euthymic  Affect full, reactive, appropriate   Thought Process: normal and logical  Thought Content: normal, no suicidality, no homicidality, delusions, or paranoia  Level of Consciousness: Alert and Oriented x3  Memory:  Intact  Attention/concentration: appropriate for age/education.   Fund of Knowledge: appears adequate  Insight:  Intact  Judgment: Intact       IMPRESSION:    Initial Evaluation 11/18/2024:  Travis Bennett is a 34 y.o. male with history of Anxiety, Insomnia and concern for OCD who presents for initial assessment. Patient reports long history of issues with anxiety with associated nail biting, intrusive thoughts and insomnia. Trialed Zoloft for a few years with some benefit with regards to panic and general anxiety, but still with ongoing nail biting and intrusive thoughts. Additionally, patient began to experience "myoclonic jerks" on Zoloft 100 mg, so was switched to Paxil. Will plan to optimize Paxil for further control of symptoms. Otherwise, no SI/HI/AVH.     Interval History 1/13/2025  Notable improvement in anxiety and overall mood reported today, though little change with regards to nail biting and intrusive thoughts. While patient would likely benefit from increase in " "Paxil dosing, he reports increase in SE since last visit, including return of "myoclonic jerks" and sexual side effects. As such, he would prefer to stay at current dosing at this time. Encouraged patient to follow up with neurology with regards to jerks. Otherwise denies SI/HI/AVH or paranoia.     Status/Progress:  Based on the examination today, the patient's problem(s) is/are inadequately controlled.  New problems have not been presented today.     DIAGNOSES:  Nail biting  Generalized Anxiety Disorder  Insomnia, unspecified type     PLAN:  Psych Med:  Continue Paxil 30 mg daily  Counseled on Sleep Hygiene   Will plan for hour long session next appointment to administer Y-BOCS   Continue therapy with Trixie Chairez LCSW     Discussed with patient informed consent, risks vs. benefits, alternative treatments, side effect profile and the inherent unpredictability of individual responses to these treatments. Answered any questions patient may have had. The patient expresses understanding of the above and displays the capacity to agree with this current plan       RETURN TO CLINIC:  6 weeks     Staff Psychiatrist: Theo Guerra MD  [facility] PGY3  1/13/2025 4:29 PM         "

## 2025-01-22 ENCOUNTER — OFFICE VISIT (OUTPATIENT)
Dept: PSYCHIATRY | Facility: OTHER | Age: 35
End: 2025-01-22
Payer: COMMERCIAL

## 2025-01-22 DIAGNOSIS — F41.1 GAD (GENERALIZED ANXIETY DISORDER): Primary | ICD-10-CM

## 2025-01-22 PROCEDURE — 90837 PSYTX W PT 60 MINUTES: CPT | Mod: 95,,, | Performed by: SOCIAL WORKER

## 2025-01-22 NOTE — PROGRESS NOTES
"The patient location is: Home ([place])  The chief complaint leading to consultation is: f/u anxiety    Visit type: audiovisual    Face to Face time with patient: 50 mins  60 minutes of total time spent on the encounter, which includes face to face time and non-face to face time preparing to see the patient (eg, review of tests), Obtaining and/or reviewing separately obtained history, Documenting clinical information in the electronic or other health record, Independently interpreting results (not separately reported) and communicating results to the patient/family/caregiver, or Care coordination (not separately reported).         Each patient to whom he or she provides medical services by telemedicine is:  (1) informed of the relationship between the physician and patient and the respective role of any other health care provider with respect to management of the patient; and (2) notified that he or she may decline to receive medical services by telemedicine and may withdraw from such care at any time.    Type of service: Individual    Content of session: Notes he and family had a fun time playing in the snow yesterday and today, wife is doing well.  Has a f/u tomorrow with psych, he will sit in on it at wife's request. States she is feeling a lot of shame and embarrassment about her dx. Pt notes he saw his own psychiatrist, increased on his Paxil, notes he talked to psychiatrist about his sexual side effects and myoclonic jerks. Notes he discussed a medication with his psychiatrist about adding NAD to help with skin picking and nail biting. Will be taking the Korina questionnaire to define subtype for OCD. Notes since he has been doing talk therapy "I'm able to put words to a lot of things that I have experienced my whole life". Discussed fear of feeling uncomfortable, fear of being out of control, how this impacts his anxiety. Discussed this at length. "I was absolutely feeding into the robin" with his "absolute terror " "because there was nothing I could do to control the situation". States he just won a [employer] hieu for $350k, feeling very happy about that.       Therapeutic techniques and approaches: behavior modification and supportive counseling. Rationale: appropriate for presenting issues.     Pt denies SI/HI. Mood was euthymic, affect matches verbal content. AAOx3, participated fully in session.     Time spent in counselinmins       "

## 2025-02-04 ENCOUNTER — PATIENT MESSAGE (OUTPATIENT)
Dept: FAMILY MEDICINE | Facility: CLINIC | Age: 35
End: 2025-02-04
Payer: COMMERCIAL

## 2025-02-04 DIAGNOSIS — Z00.00 ANNUAL PHYSICAL EXAM: ICD-10-CM

## 2025-02-04 RX ORDER — PAROXETINE 30 MG/1
30 TABLET, FILM COATED ORAL EVERY MORNING
Qty: 30 TABLET | Refills: 2 | Status: SHIPPED | OUTPATIENT
Start: 2025-02-04

## 2025-02-18 ENCOUNTER — LAB VISIT (OUTPATIENT)
Dept: LAB | Facility: HOSPITAL | Age: 35
End: 2025-02-18
Payer: COMMERCIAL

## 2025-02-18 DIAGNOSIS — Z00.00 ROUTINE GENERAL MEDICAL EXAMINATION AT A HEALTH CARE FACILITY: Primary | ICD-10-CM

## 2025-02-18 DIAGNOSIS — Z00.00 ANNUAL PHYSICAL EXAM: ICD-10-CM

## 2025-02-19 ENCOUNTER — OFFICE VISIT (OUTPATIENT)
Dept: PSYCHIATRY | Facility: OTHER | Age: 35
End: 2025-02-19
Payer: COMMERCIAL

## 2025-02-19 DIAGNOSIS — F41.1 GAD (GENERALIZED ANXIETY DISORDER): Primary | ICD-10-CM

## 2025-02-19 NOTE — PROGRESS NOTES
"The patient location is: Home ([place])  The chief complaint leading to consultation is: f/u anxiety, intrusive thoughts    Visit type: audiovisual    Face to Face time with patient: 50 mins  60 minutes of total time spent on the encounter, which includes face to face time and non-face to face time preparing to see the patient (eg, review of tests), Obtaining and/or reviewing separately obtained history, Documenting clinical information in the electronic or other health record, Independently interpreting results (not separately reported) and communicating results to the patient/family/caregiver, or Care coordination (not separately reported).         Each patient to whom he or she provides medical services by telemedicine is:  (1) informed of the relationship between the physician and patient and the respective role of any other health care provider with respect to management of the patient; and (2) notified that he or she may decline to receive medical services by telemedicine and may withdraw from such care at any time.    Type of service: Individual    Content of session: Pt states it's been a good month, states he and wife are each separately working on their goals. Pt notes that after our last session he reflected a lot on our talk about sitting with intrusive thoughts, states "after that I didn't need to get my stress ball, I just sat with my discomfort when I had the urge to bite my nails and I haven't bitten them since, it's been a month since I bit my nails". States he's really been trying to apply that to all of his intrusive thoughts. States he will still talk with his psychiatrist about medications to decrease intrusive thoughts, but is unsure if he will add anything at this point. Notes his sleep has improved a lot, continues to not drink. Doing a nightly skincare routine and feels that this is also helping with his nighttime routine. Notes wife is still struggling with things with her Mom, notes her Mom " ""continues to be a trigger". Talks about being able to find the space in between his stimulus and response (of biting his nails). Pt notes overall he feels good, we discussed spacing out appts a little further. Wants to go out a month from now. Discussed clear communication with his mother in law when it comes to her sending things to their house that they do not need. Pt states overall anxiety, OCD and intrusive thoughts are much better managed, will f/u in 5 weeks.      Therapeutic techniques and approaches: behavior modification and supportive counseling. Rationale: appropriate for presenting issues.     Pt denies SI/HI. Mood was euthymic, affect matches verbal content. AAOx3, participated fully in session.     Time spent in counselinmins       "

## 2025-02-24 ENCOUNTER — OFFICE VISIT (OUTPATIENT)
Dept: FAMILY MEDICINE | Facility: CLINIC | Age: 35
End: 2025-02-24
Payer: COMMERCIAL

## 2025-02-24 VITALS
SYSTOLIC BLOOD PRESSURE: 100 MMHG | WEIGHT: 155.19 LBS | HEART RATE: 64 BPM | DIASTOLIC BLOOD PRESSURE: 78 MMHG | RESPIRATION RATE: 18 BRPM | TEMPERATURE: 98 F | BODY MASS INDEX: 23.52 KG/M2 | OXYGEN SATURATION: 100 % | HEIGHT: 68 IN

## 2025-02-24 DIAGNOSIS — F98.8 NAIL BITING: ICD-10-CM

## 2025-02-24 DIAGNOSIS — Z00.00 ROUTINE GENERAL MEDICAL EXAMINATION AT A HEALTH CARE FACILITY: ICD-10-CM

## 2025-02-24 DIAGNOSIS — Z00.00 ANNUAL PHYSICAL EXAM: Primary | ICD-10-CM

## 2025-02-24 DIAGNOSIS — G40.309 NONINTRACTABLE GENERALIZED IDIOPATHIC EPILEPSY WITHOUT STATUS EPILEPTICUS: ICD-10-CM

## 2025-02-24 DIAGNOSIS — F41.1 GAD (GENERALIZED ANXIETY DISORDER): ICD-10-CM

## 2025-02-24 DIAGNOSIS — K58.0 IRRITABLE BOWEL SYNDROME WITH DIARRHEA: ICD-10-CM

## 2025-02-24 PROCEDURE — 99395 PREV VISIT EST AGE 18-39: CPT | Mod: S$GLB,,, | Performed by: INTERNAL MEDICINE

## 2025-02-24 PROCEDURE — 99999 PR PBB SHADOW E&M-EST. PATIENT-LVL III: CPT | Mod: PBBFAC,,, | Performed by: INTERNAL MEDICINE

## 2025-02-24 PROCEDURE — 3044F HG A1C LEVEL LT 7.0%: CPT | Mod: CPTII,S$GLB,, | Performed by: INTERNAL MEDICINE

## 2025-02-24 PROCEDURE — 3074F SYST BP LT 130 MM HG: CPT | Mod: CPTII,S$GLB,, | Performed by: INTERNAL MEDICINE

## 2025-02-24 PROCEDURE — 1160F RVW MEDS BY RX/DR IN RCRD: CPT | Mod: CPTII,S$GLB,, | Performed by: INTERNAL MEDICINE

## 2025-02-24 PROCEDURE — 3078F DIAST BP <80 MM HG: CPT | Mod: CPTII,S$GLB,, | Performed by: INTERNAL MEDICINE

## 2025-02-24 PROCEDURE — 1159F MED LIST DOCD IN RCRD: CPT | Mod: CPTII,S$GLB,, | Performed by: INTERNAL MEDICINE

## 2025-02-24 PROCEDURE — 3008F BODY MASS INDEX DOCD: CPT | Mod: CPTII,S$GLB,, | Performed by: INTERNAL MEDICINE

## 2025-02-24 NOTE — PROGRESS NOTES
Health Maintenance Due   Topic     Influenza Vaccine (1) Patient received at [facility]     COVID-19 Vaccine (6 - 2024-25 season) Patient received at [facility]

## 2025-02-25 NOTE — ASSESSMENT & PLAN NOTE
Discussed HCM with patient  - patient deferred available vaccines. Discussed the importance of vaccines.   - annual labs ordered and results reviewed  - advised patient to follow up with ophthalmologist and dentist every year  - reviewed medical, surgical, family and social history

## 2025-02-25 NOTE — ASSESSMENT & PLAN NOTE
- Continue Paxil 30mg for OCD treatment, as the patient reports improvement in symptoms and side effects have subsided.  - Consider potential addition of N-acetylcysteine (NAC) for intrusive thoughts, based on the patient's discussion with psychiatrist Dr. Guerra.  - Monitor the patient's OCD symptoms, noting improvement including cessation of nail biting, but also a flare-up following spouse's manic episode in August.  - Evaluate the patient's response to treatment for intrusive thoughts.  - Note that the patient began seeing a psychiatrist in November specifically for OCD management.  - Maintain current therapy schedule, now reduced to once a month.  - Note the patient's history of OCD and previous trial of Zoloft.

## 2025-02-25 NOTE — ASSESSMENT & PLAN NOTE
- Discuss the importance of fiber intake for managing IBS symptoms, cautioning against excessive consumption.  - Advise the patient to proceed with increased fiber intake, finding balance to avoid GI upset.  - Monitor for IBS flare-ups, noting no recent occurrences.  - Evaluate the patient's progress with increased fiber intake for IBS symptom management.

## 2025-02-25 NOTE — PROGRESS NOTES
Chief Complaint: Annual Exam      Travis Bennett  is a 34 y.o. year old patient who presents today for     History of Present Illness    CHIEF COMPLAINT:  Travis presents today for annual follow-up.    MENTAL HEALTH:  He has been seeing a psychiatrist since November for OCD management and is currently under Dr. Guerra's care, having had two visits. He reports improvement in OCD symptoms, including cessation of nail-biting behavior. His therapy sessions have been reduced to monthly frequency, and he finds the combination of psychiatry and therapy beneficial.    MEDICATIONS:  He takes Keppra for seizure management. Previously on Zoloft, he was switched to Paxil due to myoclonic jerks at higher doses. He started Paxil at 20 mg and increased to 30 mg. Initial side effects at 30 mg including myoclonic jerks and sexual dysfunction have now resolved.    CURRENT SYMPTOMS:  He reports tension headaches due to work deadline stress, constant thirst, and seasonal allergies.    GI CONCERNS:  He denies recent IBS flare-ups and reports improvement with increased fiber intake. He acknowledges previous insufficient fiber intake of approximately 10 g daily compared to the recommended 30 g. He has added high-protein, high-fiber snacks to his diet, though notes challenges in finding the right balance as excessive fiber can cause GI discomfort.    SOCIAL HISTORY:  He quit alcohol in early October with only occasional sips since then. He uses hemp-derived THC drinks in small amounts for relaxation and physical tension relief, avoiding smoking marijuana due to its stimulating mental effects.      ROS:  General: -fever, -chills, -fatigue, -weight gain, -weight loss  Eyes: -vision changes, -redness, -discharge  ENT: -ear pain, -nasal congestion, -sore throat  Cardiovascular: -chest pain, -palpitations, -lower extremity edema  Respiratory: -cough, -shortness of breath  Gastrointestinal: -abdominal pain, -nausea, -vomiting, -diarrhea,  -constipation, -blood in stool, -change in bowel habits  Genitourinary: -dysuria, -hematuria, -frequency  Musculoskeletal: -joint pain, -muscle pain  Skin: -rash, -lesion  Neurological: +headache, -dizziness, -numbness, -tingling, -tremors  Psychiatric: -anxiety, -depression, -sleep difficulty  Allergic: +seasonal allergies         Past Medical History:   Diagnosis Date    Epilepsy        Past Surgical History:   Procedure Laterality Date    SHOULDER SURGERY Left 2012    d/t seizures        Family History   Problem Relation Name Age of Onset    Asthma Mother Farideh Morrow     Breast cancer Mother Farideh Morrow     Seizures Mother Farideh Morrow     Anxiety disorder Father      Alcohol abuse Maternal Grandfather Gibson Morrison     Drug abuse Maternal Grandfather Gibson Morrison     Heart attack Maternal Grandfather Gibson Morrison          of heart attack at 58    Heart disease Paternal Grandfather Dejan Bennett         Obese, had heart attacks    Colon cancer Neg Hx      Prostate cancer Neg Hx          Social History     Socioeconomic History    Marital status:    Tobacco Use    Smoking status: Former     Current packs/day: 0.00     Types: Cigarettes     Start date: 2016     Quit date: 2016     Years since quittin.1    Tobacco comments:     Did smoke some in college but haven't in several years now   Substance and Sexual Activity    Alcohol use: Not Currently     Comment: Quit Oct 2024    Drug use: Yes     Types: Marijuana     Comment: THC drinks occasionally    Sexual activity: Yes     Partners: Female     Birth control/protection: Condom, I.U.D.     Social Drivers of Health     Financial Resource Strain: Low Risk  (2024)    Overall Financial Resource Strain (CARDIA)     Difficulty of Paying Living Expenses: Not very hard   Food Insecurity: No Food Insecurity (2024)    Hunger Vital Sign     Worried About Running Out of Food in the Last Year: Never true     Ran Out of Food in the Last Year:  Never true   Transportation Needs: No Transportation Needs (1/19/2024)    PRAPARE - Transportation     Lack of Transportation (Medical): No     Lack of Transportation (Non-Medical): No   Physical Activity: Insufficiently Active (1/19/2024)    Exercise Vital Sign     Days of Exercise per Week: 3 days     Minutes of Exercise per Session: 30 min   Stress: Stress Concern Present (1/19/2024)    [employer] [place] [employer] - Occupational Stress Questionnaire     Feeling of Stress : To some extent   Housing Stability: Low Risk  (1/19/2024)    Housing Stability Vital Sign     Unable to Pay for Housing in the Last Year: No     Number of Places Lived in the Last Year: 1     Unstable Housing in the Last Year: No       Current Medications[1]           Objective:      Vitals:    02/24/25 1538   BP: 100/78   Pulse: 64   Resp: 18   Temp: 97.5 °F (36.4 °C)       Physical Exam  Vitals and nursing note reviewed.   Constitutional:       Appearance: Normal appearance.   HENT:      Head: Normocephalic and atraumatic.   Cardiovascular:      Rate and Rhythm: Normal rate and regular rhythm.   Pulmonary:      Effort: Pulmonary effort is normal.      Breath sounds: Normal breath sounds. No wheezing or rales.   Abdominal:      General: Bowel sounds are normal.      Palpations: Abdomen is soft.      Tenderness: There is no abdominal tenderness.   Musculoskeletal:      Right lower leg: No edema.      Left lower leg: No edema.   Skin:     General: Skin is warm and dry.   Neurological:      General: No focal deficit present.      Mental Status: He is alert and oriented to person, place, and time.   Psychiatric:         Mood and Affect: Mood normal.         Behavior: Behavior normal.          Assessment:       1. Annual physical exam    2. Nonintractable generalized idiopathic epilepsy without status epilepticus    3. Routine general medical examination at a health care facility    4. MILEY (generalized anxiety disorder)    5. Nail  biting    6. Irritable bowel syndrome with diarrhea          Plan:   1. Annual physical exam  Assessment & Plan:  Discussed HCM with patient  - patient deferred available vaccines. Discussed the importance of vaccines.   - annual labs ordered and results reviewed  - advised patient to follow up with ophthalmologist and dentist every year  - reviewed medical, surgical, family and social history        2. Nonintractable generalized idiopathic epilepsy without status epilepticus  Assessment & Plan:  - Continue Keppra at current dose for seizure management, noting the patient still experiences epileptic tendencies (myoclonic jerks) when seizure threshold is lowered by SSRIs.  - Monitor for myoclonic jerks, especially when SSRI use lowers seizure threshold.  - Note that the patient's last seizure occurred after missing a few doses in a week.  - Evaluate cognitive side effects, including brain fog and minor memory issues due to medication.  - Advise the patient to continue Keppra for life for seizure management.      3. Routine general medical examination at a health care facility  -     CBC W/ AUTO DIFFERENTIAL; Future; Expected date: 02/24/2025  -     COMPREHENSIVE METABOLIC PANEL; Future; Expected date: 02/24/2025  -     HEMOGLOBIN A1C; Future; Expected date: 02/24/2025  -     Lipid Panel; Future; Expected date: 02/24/2025    4. MILEY (generalized anxiety disorder)  Assessment & Plan:  Improving  See's psych and a therapist   Cont paxil      5. Nail biting  Assessment & Plan:  - Continue Paxil 30mg for OCD treatment, as the patient reports improvement in symptoms and side effects have subsided.  - Consider potential addition of N-acetylcysteine (NAC) for intrusive thoughts, based on the patient's discussion with psychiatrist Dr. Guerra.  - Monitor the patient's OCD symptoms, noting improvement including cessation of nail biting, but also a flare-up following spouse's manic episode in August.  - Evaluate the patient's  response to treatment for intrusive thoughts.  - Note that the patient began seeing a psychiatrist in November specifically for OCD management.  - Maintain current therapy schedule, now reduced to once a month.  - Note the patient's history of OCD and previous trial of Zoloft.      6. Irritable bowel syndrome with diarrhea  Assessment & Plan:  - Discuss the importance of fiber intake for managing IBS symptoms, cautioning against excessive consumption.  - Advise the patient to proceed with increased fiber intake, finding balance to avoid GI upset.  - Monitor for IBS flare-ups, noting no recent occurrences.  - Evaluate the patient's progress with increased fiber intake for IBS symptom management.      HEADACHES:  - Educate the patient on common causes of headaches, including sleep issues, dehydration, stress, and vision problems.  - Monitor the patient's reports of tension headaches, noting recent stress and deadlines as potential triggers.    MEDICATIONS/SUPPLEMENTS:  - Monitor for sexual side effects, noting previous occurrence when increasing Paxil dosage, which have since subsided.    LIFESTYLE CHANGES:  - Evaluate recent lifestyle changes, noting positive impact on overall health.  - Monitor the patient's alcohol cessation since October and occasional use of small amounts of THC for relaxation.  - Evaluate improvement in cholesterol levels associated with dietary changes and alcohol cessation.  - Acknowledge and encourage continuation of positive lifestyle changes, including alcohol cessation.  - Travis to continue current sleep hygiene practices.  - Travis to maintain hydration, especially during periods of stress.  - Travis to sustain alcohol abstinence.    LABS:  - Assessed labs: electrolytes, liver enzymes, kidney function, A1c, and cholesterol all within normal limits.  - Ordered annual lab work for next year's check-up.    EYE EXAM REFERRAL:  - Follow up with an eye exam, as it should be covered by  insurance.    FOLLOW UP:  - Foll  ow up on February 16th at 9:30 AM for annual check-up, noting it will be a fasting appointment.  - Contact the office if patient remembers the exact date of their flu and COVID vaccinations from the previous year.         No follow-ups on file.    This note was generated with the assistance of ambient listening technology. Verbal consent was obtained by the patient and accompanying visitor(s) for the recording of patient appointment to facilitate this note. I attest to having reviewed and edited the generated note for accuracy, though some syntax or spelling errors may persist. Please contact the author of this note for any clarification.                [1]   Current Outpatient Medications:     levetiracetam XR (KEPPRA XR) 750 mg Tb24 tablet, Take 1 tablet (750 mg total) by mouth 2 (two) times daily., Disp: 180 tablet, Rfl: 3    loratadine (CLARITIN) 10 mg tablet, Take 10 mg by mouth daily as needed., Disp: , Rfl:     paroxetine (PAXIL) 30 MG tablet, Take 1 tablet (30 mg total) by mouth every morning., Disp: 30 tablet, Rfl: 2

## 2025-02-25 NOTE — ASSESSMENT & PLAN NOTE
- Continue Keppra at current dose for seizure management, noting the patient still experiences epileptic tendencies (myoclonic jerks) when seizure threshold is lowered by SSRIs.  - Monitor for myoclonic jerks, especially when SSRI use lowers seizure threshold.  - Note that the patient's last seizure occurred after missing a few doses in a week.  - Evaluate cognitive side effects, including brain fog and minor memory issues due to medication.  - Advise the patient to continue Keppra for life for seizure management.

## 2025-02-26 ENCOUNTER — RESULTS FOLLOW-UP (OUTPATIENT)
Dept: FAMILY MEDICINE | Facility: CLINIC | Age: 35
End: 2025-02-26

## 2025-03-17 ENCOUNTER — OFFICE VISIT (OUTPATIENT)
Dept: PSYCHIATRY | Facility: CLINIC | Age: 35
End: 2025-03-17
Payer: COMMERCIAL

## 2025-03-17 VITALS
SYSTOLIC BLOOD PRESSURE: 116 MMHG | HEART RATE: 94 BPM | WEIGHT: 157.94 LBS | DIASTOLIC BLOOD PRESSURE: 73 MMHG | BODY MASS INDEX: 24.02 KG/M2

## 2025-03-17 DIAGNOSIS — F41.1 GAD (GENERALIZED ANXIETY DISORDER): ICD-10-CM

## 2025-03-17 DIAGNOSIS — F98.8 NAIL BITING: Primary | ICD-10-CM

## 2025-03-17 DIAGNOSIS — G47.00 INSOMNIA, UNSPECIFIED TYPE: ICD-10-CM

## 2025-03-17 PROCEDURE — 99999 PR PBB SHADOW E&M-EST. PATIENT-LVL II: CPT | Mod: PBBFAC,,,

## 2025-03-17 RX ORDER — HYDROXYZINE PAMOATE 25 MG/1
25 CAPSULE ORAL 4 TIMES DAILY
Qty: 30 CAPSULE | Refills: 2 | Status: SHIPPED | OUTPATIENT
Start: 2025-03-17 | End: 2025-03-17

## 2025-03-17 RX ORDER — PAROXETINE 30 MG/1
30 TABLET, FILM COATED ORAL EVERY MORNING
Qty: 30 TABLET | Refills: 2 | Status: SHIPPED | OUTPATIENT
Start: 2025-03-17

## 2025-03-17 NOTE — PROGRESS NOTES
"OUTPATIENT PSYCHIATRY FOLLOW UP VISIT    ENCOUNTER DATE:  3/17/2025  SITE:  [facility]  LENGTH OF SESSION:  30 minutes      CHIEF COMPLAINT:  Anxiety,     HISTORY OF PRESENTING ILLNESS:  Travis Bennett is a 34 y.o. male with history of Anxiety, Insomnia and concern for OCD  who presents for follow up appointment.      Plan at last appointment on 11/18/2024:  Increase Paxil to 30 mg daily  Counseled on Sleep Hygiene and encouraged to download CBTi  ted  Will plan for Y-BOCS at next visit for further evaluation   Continue therapy with Trixie Chairez LCSW      Interval history as told by Patient - & or family/friend/spouse/caregiver with pts permission    Patient reports he has been doing "pretty good," overall. He believes Paxil has become more effective, and also has noticed decreased side effects with regards to "jerks" and sexual side effects. With regard to more effective, he has had a marked decrease in nail biting. Says he was able to do this "by sitting with discomfort," and other coping mechanisms learned through therapy. He states he still struggles with compulsions with regards to "mental compulsions." Says he has had some trouble getting to sleep due to anxious thought loops. Though still believes he is getting enough sleep, about 8 hours per night. Reports feeling mostly well rested throughout the day. Denies any overt depressive symptoms. No hopelessness,worthlessness or guilt. No SI or thoughts of self harm. Reports good appetite. Says he has done some research and is interested in trialing NAC. Tolerating other medications well.     PSYCHIATRIC REVIEW OF SYSTEMS:(none/ yes- better/worse/stable/& what symptoms)    Symptoms of Depression: stable    Symptoms of Anxiety/ panic attacks: stable    Symptoms of Joan/Hypomania: none    Symptoms of psychosis: none    Sleep: slight worse, sleep initiation issues     Appetite: none    Other: none    Alcohol: stable    Illicit Drugs: " "none    Psychosocial stressors: work    Risk Parameters:  Patient reports no suicidal ideation  Patient reports no homicidal ideation  Patient reports no self-injurious behavior  Patient reports no violent behavior    PSYCHIATRIC MED REVIEW    Current psych meds  Medication side effects:  "myoclonic jerks," decreased libido, trouble reaching orgasm - all improved since last visit   Medication compliance:  yes    Previous psych meds trials   Zoloft, Trazodone     PAST PSYCHIATRIC, MEDICAL, AND SOCIAL HISTORY REVIEWED  The patient's past medical, family and social history have been reviewed and updated as appropriate within the electronic medical record - see encounter notes.    MEDICAL REVIEW OF SYSTEMS:  Complete review of systems performed covering Constitutional, Musculoskeletal, Neurologic.  All systems negative.    ALL MEDICATIONS:    Current Outpatient Medications:     levetiracetam XR (KEPPRA XR) 750 mg Tb24 tablet, Take 1 tablet (750 mg total) by mouth 2 (two) times daily., Disp: 180 tablet, Rfl: 3    loratadine (CLARITIN) 10 mg tablet, Take 10 mg by mouth daily as needed., Disp: , Rfl:     paroxetine (PAXIL) 30 MG tablet, Take 1 tablet (30 mg total) by mouth every morning., Disp: 30 tablet, Rfl: 2    ALLERGIES:  Review of patient's allergies indicates:  No Known Allergies    RELEVANT LABS/STUDIES:    Lab Results   Component Value Date    WBC 6.34 02/18/2025    HGB 14.4 02/18/2025    HCT 43.7 02/18/2025    MCV 90 02/18/2025     02/18/2025     BMP  Lab Results   Component Value Date     02/18/2025    K 4.8 02/18/2025     02/18/2025    CO2 26 02/18/2025    BUN 18 02/18/2025    CREATININE 1.2 02/18/2025    CALCIUM 9.5 02/18/2025    ANIONGAP 8 02/18/2025    ESTGFRAFRICA >60 03/12/2020    EGFRNONAA >60 03/12/2020     Lab Results   Component Value Date    ALT 13 02/18/2025    AST 22 02/18/2025    ALKPHOS 56 02/18/2025    BILITOT 0.8 02/18/2025     Lab Results   Component Value Date    TSH 1.224 " "03/12/2020     Lab Results   Component Value Date    HGBA1C 5.0 02/18/2025       VITALS  Vitals:    03/17/25 1530   BP: 116/73   Pulse: 94   Weight: 71.6 kg (157 lb 15.4 oz)       PHYSICAL EXAM  General: well developed, well nourished  Neurologic:   Gait: Normal   Psychomotor signs:  No involuntary movements or tremor  AIMS: none    PSYCHIATRIC EXAM:     Mental Status Exam:  Appearance: unremarkable, age appropriate  Behavior/Cooperation: normal, cooperative  Speech: normal tone, normal rate, normal pitch, normal volume  Language: uses words appropriately; NO aphasia or dysarthria  Mood: euthymic  Affect full, reactive, appropriate   Thought Process: normal and logical  Thought Content: normal, no suicidality, no homicidality, delusions, or paranoia  Level of Consciousness: Alert and Oriented x3  Memory:  Intact  Attention/concentration: appropriate for age/education.   Fund of Knowledge: appears adequate  Insight:  Intact  Judgment: Intact       IMPRESSION:    Initial Evaluation 11/18/2024:  Travis Bennett is a 34 y.o. male with history of Anxiety, Insomnia and concern for OCD who presents for initial assessment. Patient reports long history of issues with anxiety with associated nail biting, intrusive thoughts and insomnia. Trialed Zoloft for a few years with some benefit with regards to panic and general anxiety, but still with ongoing nail biting and intrusive thoughts. Additionally, patient began to experience "myoclonic jerks" on Zoloft 100 mg, so was switched to Paxil. Will plan to optimize Paxil for further control of symptoms. Otherwise, no SI/HI/AVH.     Interval History 3/17/2025  Patient with marked improvement in compulsive nail biting. Still with some ongoing intrusive thoughts that are distressing to him, though he states overall he has noticed marked improvement OCD symptoms. Also with decrease in both "jerks" and sexual side effects. No acute mood complaints today. Denies SI/HI/AVH or paranoia. Will " maintain current regimen and see back in 2 months. Additionally, patient reports he would like to try over the counter NAC for ongoing OCD symptoms. Counseled him to use with caution given limited regulation of these products.     Status/Progress:  Based on the examination today, the patient's problem(s) is/are improved.  New problems have not been presented today.     DIAGNOSES:  Nail biting  Generalized Anxiety Disorder  Insomnia, unspecified type     PLAN:  Psych Med:  Continue Paxil 30 mg daily  Counseled on Sleep Hygiene   Will review Y-BOCS results during next visit  Continue therapy with Trixie Chairez LCSW     Discussed with patient informed consent, risks vs. benefits, alternative treatments, side effect profile and the inherent unpredictability of individual responses to these treatments. Answered any questions patient may have had. The patient expresses understanding of the above and displays the capacity to agree with this current plan       RETURN TO CLINIC:  2 months     Staff Psychiatrist: Columba Guerra MD  [facility] PGY3  3/17/2025 4:29 PM

## 2025-03-28 ENCOUNTER — OFFICE VISIT (OUTPATIENT)
Dept: PSYCHIATRY | Facility: OTHER | Age: 35
End: 2025-03-28
Payer: COMMERCIAL

## 2025-03-28 DIAGNOSIS — F42.2 MIXED OBSESSIONAL THOUGHTS AND ACTS: ICD-10-CM

## 2025-03-28 DIAGNOSIS — F41.1 GAD (GENERALIZED ANXIETY DISORDER): Primary | ICD-10-CM

## 2025-03-28 PROCEDURE — 90837 PSYTX W PT 60 MINUTES: CPT | Mod: 95,,, | Performed by: SOCIAL WORKER

## 2025-03-28 PROCEDURE — 3044F HG A1C LEVEL LT 7.0%: CPT | Mod: CPTII,95,, | Performed by: SOCIAL WORKER

## 2025-03-28 NOTE — PROGRESS NOTES
"The patient location is: Home ([place])  The chief complaint leading to consultation is: f/u anxiety, OCD    Visit type: audiovisual    Face to Face time with patient: 50 mins  60 minutes of total time spent on the encounter, which includes face to face time and non-face to face time preparing to see the patient (eg, review of tests), Obtaining and/or reviewing separately obtained history, Documenting clinical information in the electronic or other health record, Independently interpreting results (not separately reported) and communicating results to the patient/family/caregiver, or Care coordination (not separately reported).         Each patient to whom he or she provides medical services by telemedicine is:  (1) informed of the relationship between the physician and patient and the respective role of any other health care provider with respect to management of the patient; and (2) notified that he or she may decline to receive medical services by telemedicine and may withdraw from such care at any time.    Type of service: Individual    Content of session: Pt notes things are going well, states work is going well and is getting some projects completed. Pt states he saw his psychiatrist this week and did some testing about OCD and his intrusive thoughts, notes he is considered "moderate", notes he has not bitten his nails in 3 months, seeing the dentist and getting the chips in his teeth filled. Started NAC, an over the counter supplement that can improve and reduce intrusive thoughts and urges, skin picking, etc. States he knows that his score on the OCD scale is a 22, notes the NAC is supposed to help with OCD by lowering the score by 20%, so pt feels he will try it and continue to monitor. States wife went to her psych appt yesterday and is feeling stable. Pt notes he is still not talking to his Mom and step-Dad, states "I don't want to have a relationship with them if I'm always questioning" (if they're drinking). " "Notes he used to get bothered if he wasn't talking to his parents but feels better about that choice now. Pt feels that he has made progress on feeling like he can post what he wants to post and not worry as much about the social implications. Discussed the uncertainty in our current political climate and pts ability to manage his own anxiety about this, feels our sessions have helped, as well as an understanding of what is happening in his brain, and taking Paxil. "I feel so much more soothed than I ever did with the sertraline". Overall, pt coping well, will f/u in 4 weeks.      Therapeutic techniques and approaches: behavior modification and supportive counseling. Rationale: appropriate for presenting issues.     Pt denies SI/HI. Mood was euthymic, affect matches verbal content. AAOx3, participated fully in session.     Time spent in counselinmins       "

## 2025-05-19 ENCOUNTER — HOSPITAL ENCOUNTER (OUTPATIENT)
Dept: RADIOLOGY | Facility: OTHER | Age: 35
Discharge: HOME | End: 2025-05-19
Payer: COMMERCIAL

## 2025-05-19 ENCOUNTER — RESULTS FOLLOW-UP (OUTPATIENT)
Dept: INTERNAL MEDICINE | Facility: CLINIC | Age: 35
End: 2025-05-19

## 2025-05-19 ENCOUNTER — OFFICE VISIT (OUTPATIENT)
Dept: INTERNAL MEDICINE | Facility: CLINIC | Age: 35
End: 2025-05-19
Payer: COMMERCIAL

## 2025-05-19 VITALS
DIASTOLIC BLOOD PRESSURE: 60 MMHG | OXYGEN SATURATION: 99 % | HEIGHT: 68 IN | SYSTOLIC BLOOD PRESSURE: 104 MMHG | BODY MASS INDEX: 23.95 KG/M2 | WEIGHT: 158.06 LBS | HEART RATE: 67 BPM

## 2025-05-19 DIAGNOSIS — K58.0 IRRITABLE BOWEL SYNDROME WITH DIARRHEA: ICD-10-CM

## 2025-05-19 DIAGNOSIS — K59.09 OTHER CONSTIPATION: ICD-10-CM

## 2025-05-19 DIAGNOSIS — R30.0 DYSURIA: Primary | ICD-10-CM

## 2025-05-19 DIAGNOSIS — R10.31 RIGHT LOWER QUADRANT ABDOMINAL PAIN: ICD-10-CM

## 2025-05-19 DIAGNOSIS — G40.909 NONINTRACTABLE EPILEPSY WITHOUT STATUS EPILEPTICUS, UNSPECIFIED EPILEPSY TYPE: ICD-10-CM

## 2025-05-19 DIAGNOSIS — Z87.19 HISTORY OF IBS: ICD-10-CM

## 2025-05-19 LAB
BILIRUB SERPL-MCNC: NEGATIVE MG/DL
BILIRUB UR QL STRIP.AUTO: NEGATIVE
BLOOD URINE, POC: NEGATIVE
CLARITY UR: CLEAR
CLARITY, POC UA: CLEAR
COLOR UR AUTO: YELLOW
COLOR, POC UA: NORMAL
GLUCOSE UR QL STRIP: NEGATIVE
GLUCOSE UR QL STRIP: NEGATIVE
HGB UR QL STRIP: NEGATIVE
KETONES UR QL STRIP: NEGATIVE
KETONES UR QL STRIP: NORMAL
LEUKOCYTE ESTERASE UR QL STRIP: NEGATIVE
LEUKOCYTE ESTERASE URINE, POC: NEGATIVE
NITRITE UR QL STRIP: NEGATIVE
NITRITE, POC UA: NEGATIVE
PH UR STRIP: 7 [PH]
PH, POC UA: 7
PROT UR QL STRIP: NEGATIVE
PROTEIN, POC: NEGATIVE
SP GR UR STRIP: 1
SPECIFIC GRAVITY, POC UA: 1.01
UROBILINOGEN UR STRIP-ACNC: NEGATIVE EU/DL
UROBILINOGEN, POC UA: NORMAL

## 2025-05-19 PROCEDURE — 1159F MED LIST DOCD IN RCRD: CPT | Mod: CPTII,S$GLB,,

## 2025-05-19 PROCEDURE — 3074F SYST BP LT 130 MM HG: CPT | Mod: CPTII,S$GLB,,

## 2025-05-19 PROCEDURE — 3044F HG A1C LEVEL LT 7.0%: CPT | Mod: CPTII,S$GLB,,

## 2025-05-19 PROCEDURE — 74019 RADEX ABDOMEN 2 VIEWS: CPT | Mod: TC,FY

## 2025-05-19 PROCEDURE — 3078F DIAST BP <80 MM HG: CPT | Mod: CPTII,S$GLB,,

## 2025-05-19 PROCEDURE — 99999 PR PBB SHADOW E&M-EST. PATIENT-LVL III: CPT | Mod: PBBFAC,,,

## 2025-05-19 PROCEDURE — 74019 RADEX ABDOMEN 2 VIEWS: CPT | Mod: 26,,, | Performed by: RADIOLOGY

## 2025-05-19 PROCEDURE — 99214 OFFICE O/P EST MOD 30 MIN: CPT | Mod: S$GLB,,,

## 2025-05-19 PROCEDURE — 3008F BODY MASS INDEX DOCD: CPT | Mod: CPTII,S$GLB,,

## 2025-05-19 PROCEDURE — 81002 URINALYSIS NONAUTO W/O SCOPE: CPT | Mod: S$GLB,,,

## 2025-05-19 PROCEDURE — 1160F RVW MEDS BY RX/DR IN RCRD: CPT | Mod: CPTII,S$GLB,,

## 2025-05-19 PROCEDURE — 81003 URINALYSIS AUTO W/O SCOPE: CPT

## 2025-05-19 NOTE — ASSESSMENT & PLAN NOTE
-continue increased fiber but careful with AE  -xray today to r/o large stool burden or impaction, low concern for acute abd  -continue IBS diet  -consider referral to Gastro  -ensure hydration with water

## 2025-05-19 NOTE — PROGRESS NOTES
HPI     Chief Complaint:  Chief Complaint   Patient presents with    Abdominal Pain       Travis Bennett is a 34 y.o. male with multiple medical diagnoses as listed in the medical history and problem list that presents for   Chief Complaint   Patient presents with    Abdominal Pain   .   Patient is not known to me with his last appointment in this department on Visit date not found.      HPI    Abd pain -  Abd pain/pressure for a few weeks. Located to RLQ with tenderness. Has not resolved, ebs and flows. Thinks It could be constipation. Increased fiber  recently and increased gas with that. PMH of IBS - diarrhea since a kid. Recently less frequent stools. Trying to move more, drink mor eliquid. Eating more fiber.    Other symptoms - bladder discomfort, during and after urination. Also 5 lb weight gain in weeks. Thinks he also has hrmorrhoids, history of but lately worse. No rectal pain or bleeding presently.     No fever.   LBM  yesterday and able to evacuate, maybe not fully. Prior to that had thin strips/robin but yesterday passed full stool. No blood. Uses bidet. Only time he sees blood in the past is from using toilet paper.     After eating/drinking will feel more full/tenderness localized to lower abd/RLQ with lower abd tightness over bladder.     Urine  -some dribbling, incomplete, spasms  -Some pressure in tail bone but no flank pain  -no fever  -no sick conctacts  -no travel    No history of UTIs, no STD concerns, no hematuria, no discharge, no rash, no testicular pain    History of GERD but improved since quitting drinking, will take tums as needed.   No street drugs. No opioids.     Annual with PCP 2/2025      OCD - managed by Dr. Walton  -paxil    Seizure  -keppra  -Last since in 2017?     IBS with diarrhea  -fiber    Other concerns below  Assessment & Plan       1. Dysuria  -UA with dipstick  -hold off on empiric treatment based on normal dipstick  -     POCT URINE DIPSTICK WITHOUT MICROSCOPE  -      Urinalysis, Reflex to Urine Culture Urine, Clean Catch; Future; Expected date: 05/19/2025    2. History of IBS  -     X-Ray Abdomen Flat And Erect; Future; Expected date: 05/19/2025    3. Other constipation  -xray today  -continue IBS siet  -fiber intake  -consider further imaging if imaging/UA normal and pain continues    4. Right lower quadrant abdominal pain  See above  Low concern for acute abd  Pt reports abd pain x 3-4 weeks    No  weight loss, fever, N/V/D, chills, poor appetite      Denies alcohol  Denies recent international travel  Denies sick contacts    PE:   normal BS   No tympany or dullness on percussion  Tenderness noted to periumbilical  No Guarding  No rebound tenderness  No palpable mass  5. Nonintractable epilepsy without status epilepticus, unspecified epilepsy type  Stable, asymptomatic chronic condition.  Will continue to maximize risk factor reduction and adjust medication as needed  Keppra.     6. Irritable bowel syndrome with diarrhea  Assessment & Plan:  -continue increased fiber but careful with AE  -xray today to r/o large stool burden or impaction, low concern for acute abd  -continue IBS diet  -consider referral to Gastro  -ensure hydration with water            --------------------------------------------      Health Maintenance:  Health Maintenance         Date Due Completion Date    COVID-19 Vaccine (6 - 2024-25 season) 09/01/2024 2/25/2023    Influenza Vaccine (Season Ended) 09/01/2025 10/8/2022    TETANUS VACCINE 01/01/2028 1/1/2018 (Done)    Override on 1/1/2018: Done    RSV Vaccine (Age 60+ and Pregnant patients) (1 - 1-dose 75+ series) 05/24/2067 ---            Health maintenance reviewed    Follow Up:  Follow up if symptoms worsen or fail to improve.    Exam     Review of Systems:  (as noted above)  Review of Systems    Physical Exam:   Physical Exam  Vitals reviewed.   Constitutional:       General: He is not in acute distress.     Appearance: Normal appearance. He is not  "toxic-appearing.   HENT:      Head: Normocephalic and atraumatic.   Eyes:      General: No scleral icterus.     Conjunctiva/sclera: Conjunctivae normal.   Cardiovascular:      Rate and Rhythm: Normal rate and regular rhythm.      Pulses: Normal pulses.      Heart sounds: Normal heart sounds. No murmur heard.  Pulmonary:      Effort: Pulmonary effort is normal.   Abdominal:      General: Abdomen is flat. Bowel sounds are normal. There is no distension.      Palpations: There is no hepatomegaly or mass.      Tenderness: There is abdominal tenderness in the periumbilical area. There is no right CVA tenderness or left CVA tenderness. Negative signs include Matamoros's sign and McBurney's sign.      Hernia: No hernia is present.       Musculoskeletal:      Cervical back: Normal range of motion.   Skin:     General: Skin is warm.      Capillary Refill: Capillary refill takes less than 2 seconds.   Neurological:      General: No focal deficit present.      Mental Status: He is alert and oriented to person, place, and time.   Psychiatric:         Mood and Affect: Mood normal.       Vitals:    05/19/25 1030   BP: 104/60   BP Location: Left arm   Patient Position: Sitting   Pulse: 67   SpO2: 99%   Weight: 71.7 kg (158 lb 1.1 oz)   Height: 5' 8" (1.727 m)      Body mass index is 24.03 kg/m².    Lab Results   Component Value Date    WBC 6.34 02/18/2025    HGB 14.4 02/18/2025    HCT 43.7 02/18/2025     02/18/2025    CHOL 153 02/18/2025    TRIG 55 02/18/2025    HDL 49 02/18/2025    ALT 13 02/18/2025    AST 22 02/18/2025     02/18/2025    K 4.8 02/18/2025     02/18/2025    CREATININE 1.2 02/18/2025    BUN 18 02/18/2025    CO2 26 02/18/2025    TSH 1.224 03/12/2020    HGBA1C 5.0 02/18/2025       The ASCVD Risk score (Adarsh DK, et al., 2019) failed to calculate for the following reasons:    The 2019 ASCVD risk score is only valid for ages 40 to 79    (Imaging have been independently reviewed)    History     Past " Medical History:  Past Medical History:   Diagnosis Date    Epilepsy        Past Surgical History:  Past Surgical History:   Procedure Laterality Date    SHOULDER SURGERY Left 2012    d/t seizures       Social History:  Social History[1]    Family History:  Family History   Problem Relation Name Age of Onset    Asthma Mother Farideh Morrow     Breast cancer Mother Farideh Morrow     Seizures Mother Farideh Morrow     Anxiety disorder Father      Alcohol abuse Maternal Grandfather Gibson Morrison     Drug abuse Maternal Grandfather Gibson Morrison     Heart attack Maternal Grandfather Gibson Morrison          of heart attack at 58    Heart disease Paternal Grandfather Dejan Bennett         Obese, had heart attacks    Colon cancer Neg Hx      Prostate cancer Neg Hx         Allergies and Medications: (updated and reviewed)  Review of patient's allergies indicates:  No Known Allergies  Current Medications[2]    Patient Care Team:  Michelle Foreman MD as PCP - General (Internal Medicine)         - The patient is given an After Visit Summary that lists all medications with directions, allergies, education, orders placed during this encounter and follow-up instructions.      - I have reviewed the patient's medical information including past medical, family, and social history sections including the medications and allergies.      - We discussed the patient's current medications.     This note was created by combination of typed  and MModal dictation.  Transcription errors may be present.  If there are any questions, please contact me.          This note was generated with the assistance of ambient listening technology. Verbal consent was obtained by the patient and accompanying visitor(s) for the recording of patient appointment to facilitate this note. I attest to having reviewed and edited the generated note for accuracy, though some syntax or spelling errors may persist. Please contact the author of this note for any  clarification.         [1]   Social History  Socioeconomic History    Marital status:    Tobacco Use    Smoking status: Former     Current packs/day: 0.00     Types: Cigarettes     Start date: 2016     Quit date: 2016     Years since quittin.3    Tobacco comments:     Did smoke some in college but haven't in several years now   Substance and Sexual Activity    Alcohol use: Not Currently     Comment: Quit Oct 2024    Drug use: Yes     Types: Marijuana     Comment: THC drinks occasionally    Sexual activity: Yes     Partners: Female     Birth control/protection: Condom, I.U.D.     Social Drivers of Health     Financial Resource Strain: Low Risk  (3/17/2025)    Overall Financial Resource Strain (CARDIA)     Difficulty of Paying Living Expenses: Not hard at all   Food Insecurity: No Food Insecurity (3/17/2025)    Hunger Vital Sign     Worried About Running Out of Food in the Last Year: Never true     Ran Out of Food in the Last Year: Never true   Transportation Needs: No Transportation Needs (3/17/2025)    PRAPARE - Transportation     Lack of Transportation (Medical): No     Lack of Transportation (Non-Medical): No   Physical Activity: Insufficiently Active (3/17/2025)    Exercise Vital Sign     Days of Exercise per Week: 2 days     Minutes of Exercise per Session: 30 min   Stress: Stress Concern Present (3/17/2025)    [employer] - Occupational Stress Questionnaire     Feeling of Stress : To some extent   Housing Stability: Low Risk  (3/17/2025)    Housing Stability Vital Sign     Unable to Pay for Housing in the Last Year: No     Number of Times Moved in the Last Year: 0     Homeless in the Last Year: No   [2]   Current Outpatient Medications   Medication Sig Dispense Refill    levetiracetam XR (KEPPRA XR) 750 mg Tb24 tablet Take 1 tablet (750 mg total) by mouth 2 (two) times daily. 180 tablet 3    loratadine (CLARITIN) 10 mg tablet Take 10 mg by mouth daily as needed.       paroxetine (PAXIL) 30 MG tablet Take 1 tablet (30 mg total) by mouth every morning. 30 tablet 2     No current facility-administered medications for this visit.

## 2025-05-23 ENCOUNTER — OFFICE VISIT (OUTPATIENT)
Dept: PSYCHIATRY | Facility: OTHER | Age: 35
End: 2025-05-23
Payer: COMMERCIAL

## 2025-05-23 DIAGNOSIS — F41.1 GAD (GENERALIZED ANXIETY DISORDER): Primary | ICD-10-CM

## 2025-05-23 PROCEDURE — 90837 PSYTX W PT 60 MINUTES: CPT | Mod: 95,,, | Performed by: SOCIAL WORKER

## 2025-05-23 PROCEDURE — 3044F HG A1C LEVEL LT 7.0%: CPT | Mod: CPTII,95,, | Performed by: SOCIAL WORKER

## 2025-05-23 NOTE — PROGRESS NOTES
"The patient location is: [place]  The chief complaint leading to consultation is: f/u anxiety    Visit type: audiovisual    Face to Face time with patient: 50 mins  60 minutes of total time spent on the encounter, which includes face to face time and non-face to face time preparing to see the patient (eg, review of tests), Obtaining and/or reviewing separately obtained history, Documenting clinical information in the electronic or other health record, Independently interpreting results (not separately reported) and communicating results to the patient/family/caregiver, or Care coordination (not separately reported).         Each patient to whom he or she provides medical services by telemedicine is:  (1) informed of the relationship between the physician and patient and the respective role of any other health care provider with respect to management of the patient; and (2) notified that he or she may decline to receive medical services by telemedicine and may withdraw from such care at any time.    Type of service: Individual    Content of session: Pt states he is feeling more comfortable in social situations, feels his medication continues to help him. Notes he got his professor ratings back today and they were very good, gave an example of something that would have really derailed him before that he was able to rationalize this time and not let it bother him. Notes things at home are good, wife increased her medication and is feeling good. Notes his OCD feels somewhat well controlled, has not been nail biting, notes some tranferring to biting his lip, sometimes it gets to the point of bleeding, but trying to apply some of the same principles he used with nail biting to manage that, notes it's related to stress and so it's better now that it's the end of the semester. Notes he is still dealing with general rumination "and the feeling that I will miss something important if I don't ruminate". Notes some health " concerns that he's been ruminating about. Discussed grounding techniques that he can use with mental rumination, discussed setting aside time to ruminate. Pt notes he's having a lot of tension headaches, notes he has a lot of stress with his two children. Pt doing some skin pulling during session while talking about his stress. Discussed stress mgmt, notes he has started walking on a treadmill, is reading books for pleasure. States boundaries are still firm with his Mom, communicating well with his Dad. Overall, pt managing anxiety well, discussed setting aside time for rumination, especially in the morning, and discussed also walking in the morning during his rumination/day planning time. Will f/u in 8 weeks.      Therapeutic techniques and approaches: behavior modification and supportive counseling. Rationale: appropriate for presenting issues.     Pt denies SI/HI. Mood was euthymic, affect matches verbal content. AAOx3, participated fully in session.     Time spent in counselinmins

## 2025-05-29 ENCOUNTER — PATIENT MESSAGE (OUTPATIENT)
Dept: INTERNAL MEDICINE | Facility: CLINIC | Age: 35
End: 2025-05-29
Payer: COMMERCIAL

## 2025-05-30 DIAGNOSIS — K64.9 HEMORRHOIDS, UNSPECIFIED HEMORRHOID TYPE: ICD-10-CM

## 2025-05-30 DIAGNOSIS — K59.09 OTHER CONSTIPATION: Primary | ICD-10-CM

## 2025-05-30 RX ORDER — HYDROCORTISONE ACETATE PRAMOXINE HCL 1; 1 G/100G; G/100G
CREAM TOPICAL 3 TIMES DAILY
Qty: 28.4 G | Refills: 1 | Status: SHIPPED | OUTPATIENT
Start: 2025-05-30

## 2025-06-01 ENCOUNTER — PATIENT MESSAGE (OUTPATIENT)
Dept: FAMILY MEDICINE | Facility: CLINIC | Age: 35
End: 2025-06-01
Payer: COMMERCIAL

## 2025-06-01 DIAGNOSIS — G40.309 NONINTRACTABLE GENERALIZED IDIOPATHIC EPILEPSY WITHOUT STATUS EPILEPTICUS: ICD-10-CM

## 2025-06-02 ENCOUNTER — PATIENT MESSAGE (OUTPATIENT)
Dept: FAMILY MEDICINE | Facility: CLINIC | Age: 35
End: 2025-06-02
Payer: COMMERCIAL

## 2025-06-02 RX ORDER — LEVETIRACETAM 750 MG/1
750 TABLET, EXTENDED RELEASE ORAL 2 TIMES DAILY
Qty: 180 TABLET | Refills: 3 | Status: SHIPPED | OUTPATIENT
Start: 2025-06-02 | End: 2026-06-02

## 2025-06-09 ENCOUNTER — OFFICE VISIT (OUTPATIENT)
Dept: PSYCHIATRY | Facility: CLINIC | Age: 35
End: 2025-06-09
Payer: COMMERCIAL

## 2025-06-09 VITALS
DIASTOLIC BLOOD PRESSURE: 68 MMHG | SYSTOLIC BLOOD PRESSURE: 102 MMHG | BODY MASS INDEX: 24.37 KG/M2 | WEIGHT: 160.25 LBS | HEART RATE: 74 BPM

## 2025-06-09 DIAGNOSIS — G47.00 INSOMNIA, UNSPECIFIED TYPE: ICD-10-CM

## 2025-06-09 DIAGNOSIS — F41.1 GAD (GENERALIZED ANXIETY DISORDER): Primary | ICD-10-CM

## 2025-06-09 PROCEDURE — 99214 OFFICE O/P EST MOD 30 MIN: CPT | Mod: S$GLB,,, | Performed by: PSYCHIATRY & NEUROLOGY

## 2025-06-09 PROCEDURE — 3044F HG A1C LEVEL LT 7.0%: CPT | Mod: CPTII,S$GLB,, | Performed by: PSYCHIATRY & NEUROLOGY

## 2025-06-09 PROCEDURE — 3078F DIAST BP <80 MM HG: CPT | Mod: CPTII,S$GLB,, | Performed by: PSYCHIATRY & NEUROLOGY

## 2025-06-09 PROCEDURE — 99999 PR PBB SHADOW E&M-EST. PATIENT-LVL II: CPT | Mod: PBBFAC,,,

## 2025-06-09 PROCEDURE — 3074F SYST BP LT 130 MM HG: CPT | Mod: CPTII,S$GLB,, | Performed by: PSYCHIATRY & NEUROLOGY

## 2025-06-09 PROCEDURE — 3008F BODY MASS INDEX DOCD: CPT | Mod: CPTII,S$GLB,, | Performed by: PSYCHIATRY & NEUROLOGY

## 2025-06-09 RX ORDER — PAROXETINE 30 MG/1
30 TABLET, FILM COATED ORAL EVERY MORNING
Qty: 30 TABLET | Refills: 2 | Status: SHIPPED | OUTPATIENT
Start: 2025-06-09

## 2025-06-09 NOTE — PROGRESS NOTES
"OUTPATIENT PSYCHIATRY FOLLOW UP VISIT    ENCOUNTER DATE:  6/9/2025  SITE:  [facility]  LENGTH OF SESSION:  30 minutes      CHIEF COMPLAINT:  Anxiety,     HISTORY OF PRESENTING ILLNESS:  Travis Bennett is a 35 y.o. male with history of Anxiety, Insomnia and concern for OCD  who presents for follow up appointment.      Plan at last appointment on 3/17/2025:  Continue Paxil 30 mg daily  Counseled on Sleep Hygiene   Will review Y-BOCS results during next visit  Continue therapy with Trixie Chairez LCSW     Discussed with patient informed consent, risks vs. benefits, alternative treatments, side effect profile and the inherent unpredictability of individual responses to these treatments. Answered any questions patient may have had. The patient expresses understanding of the above and displays the capacity to agree with this current plan     Interval history as told by Patient - & or family/friend/spouse/caregiver with pts permission    Patient reports trialing NAC for 6 weeks, up to 2400 mg,  but did not notice any benefit, and was concerned it may have been worsening symptoms, so he has since stopped. Reports ongoing OCD symptoms of intrusive thoughts of "fearing he has missed something important," but notes marked improvement in social anxiety and overall improvement in severe OCD symptoms (specifically nailbiting). He also states that work is going very well. He continues with therapy, now going every 6 weeks which he says is going well. Overall he feels that Paxil has been working well for his OCD symptoms, and has been tolerating well other than continued mild sexual side effects. However currently, he feels that benefit from medication outweigh current side effects. He does not endorse any overt depressive symptoms today. He says that his relationship with wife is very healthy right now. Sleeping and eating well. No SI/HI/AVH or paranoia. Otherwise no further complaints.     PSYCHIATRIC " "REVIEW OF SYSTEMS:(none/ yes- better/worse/stable/& what symptoms)    Symptoms of Depression: stable    Symptoms of Anxiety/ panic attacks: stable    Symptoms of Joan/Hypomania: none    Symptoms of psychosis: none    Sleep: slight worse, sleep initiation issues     Appetite: none    Other: none    Alcohol: stable    Illicit Drugs: none    Psychosocial stressors: work    Risk Parameters:  Patient reports no suicidal ideation  Patient reports no homicidal ideation  Patient reports no self-injurious behavior  Patient reports no violent behavior    PSYCHIATRIC MED REVIEW    Current psych meds  Medication side effects:  "myoclonic jerks," decreased libido, trouble reaching orgasm - all improved since last visit   Medication compliance:  yes    Previous psych meds trials   Zoloft, Trazodone     PAST PSYCHIATRIC, MEDICAL, AND SOCIAL HISTORY REVIEWED  The patient's past medical, family and social history have been reviewed and updated as appropriate within the electronic medical record - see encounter notes.    MEDICAL REVIEW OF SYSTEMS:  Complete review of systems performed covering Constitutional, Musculoskeletal, Neurologic.  All systems negative.    ALL MEDICATIONS:    Current Outpatient Medications:     levetiracetam XR (KEPPRA XR) 750 mg Tb24 tablet, Take 1 tablet (750 mg total) by mouth 2 (two) times daily., Disp: 180 tablet, Rfl: 3    loratadine (CLARITIN) 10 mg tablet, Take 10 mg by mouth daily as needed., Disp: , Rfl:     paroxetine (PAXIL) 30 MG tablet, Take 1 tablet (30 mg total) by mouth every morning., Disp: 30 tablet, Rfl: 2    pramoxine-hydrocortisone (PROCTOCREAM-HC) 1-1 % rectal cream, Place rectally 3 (three) times daily., Disp: 28.4 g, Rfl: 1    ALLERGIES:  Review of patient's allergies indicates:  No Known Allergies    RELEVANT LABS/STUDIES:    Lab Results   Component Value Date    WBC 6.34 02/18/2025    HGB 14.4 02/18/2025    HCT 43.7 02/18/2025    MCV 90 02/18/2025     02/18/2025     BMP  Lab " "Results   Component Value Date     02/18/2025    K 4.8 02/18/2025     02/18/2025    CO2 26 02/18/2025    BUN 18 02/18/2025    CREATININE 1.2 02/18/2025    CALCIUM 9.5 02/18/2025    ANIONGAP 8 02/18/2025    ESTGFRAFRICA >60 03/12/2020    EGFRNONAA >60 03/12/2020     Lab Results   Component Value Date    ALT 13 02/18/2025    AST 22 02/18/2025    ALKPHOS 56 02/18/2025    BILITOT 0.8 02/18/2025     Lab Results   Component Value Date    TSH 1.224 03/12/2020     Lab Results   Component Value Date    HGBA1C 5.0 02/18/2025       VITALS  There were no vitals filed for this visit.      PHYSICAL EXAM  General: well developed, well nourished  Neurologic:   Gait: Normal   Psychomotor signs:  No involuntary movements or tremor  AIMS: none    PSYCHIATRIC EXAM:     Mental Status Exam:  Appearance: unremarkable, age appropriate  Behavior/Cooperation: normal, cooperative  Speech: normal tone, normal rate, normal pitch, normal volume  Language: uses words appropriately; NO aphasia or dysarthria  Mood: euthymic  Affect full, reactive, appropriate   Thought Process: normal and logical  Thought Content: normal, no suicidality, no homicidality, delusions, or paranoia  Level of Consciousness: Alert and Oriented x3  Memory:  Intact  Attention/concentration: appropriate for age/education.   Fund of Knowledge: appears adequate  Insight:  Intact  Judgment: Intact       IMPRESSION:    Initial Evaluation 11/18/2024:  Travis Bennett is a 34 y.o. male with history of Anxiety, Insomnia and concern for OCD who presents for initial assessment. Patient reports long history of issues with anxiety with associated nail biting, intrusive thoughts and insomnia. Trialed Zoloft for a few years with some benefit with regards to panic and general anxiety, but still with ongoing nail biting and intrusive thoughts. Additionally, patient began to experience "myoclonic jerks" on Zoloft 100 mg, so was switched to Paxil. Will plan to optimize Paxil for " "further control of symptoms. Otherwise, no SI/HI/AVH.     Interval History 6/9/2025    Patient with continued improvement in OCD symptoms, though still with some ongoing intrusive thoughts at times. Overall he feels that Paxil has been working well for his OCD symptoms, and has been tolerating well other than continued mild sexual side effects. However currently, he feels that benefit from medication outweigh current side effects. Discussed possibility of adding Buspar in the future to possibly help with sexual side effects, and patient plans to look into this. Still with ongoing improvement in muscle "jerks" side effects previously reported.  Denies SI/HI/AVH or paranoia.Sleeping and eating well. Patient would like to maintain current medication regimen at this time. Of note, he did trial NAC between last visit and now, but did not notice benefit.      Status/Progress:  Based on the examination today, the patient's problem(s) is/are improved.  New problems have not been presented today.     DIAGNOSES:  Nail biting  Generalized Anxiety Disorder  Insomnia, unspecified type     PLAN:  Psych Med:  Continue Paxil 30 mg daily  Counseled on Sleep Hygiene   Continue therapy with Trixie Chairez LCSW   Y-BOCS reviewed and uploaded to chart     Discussed with patient informed consent, risks vs. benefits, alternative treatments, side effect profile and the inherent unpredictability of individual responses to these treatments. Answered any questions patient may have had. The patient expresses understanding of the above and displays the capacity to agree with this current plan       RETURN TO CLINIC:  2-3 months     Staff Psychiatrist: Columba Guerra MD  [facility] PGY3  6/9/2025 4:29 PM             "

## 2025-07-15 ENCOUNTER — OFFICE VISIT (OUTPATIENT)
Dept: PSYCHIATRY | Facility: OTHER | Age: 35
End: 2025-07-15
Payer: COMMERCIAL

## 2025-07-15 DIAGNOSIS — F42.2 MIXED OBSESSIONAL THOUGHTS AND ACTS: ICD-10-CM

## 2025-07-15 DIAGNOSIS — F41.1 GAD (GENERALIZED ANXIETY DISORDER): Primary | ICD-10-CM

## 2025-07-15 PROCEDURE — 3044F HG A1C LEVEL LT 7.0%: CPT | Mod: CPTII,95,, | Performed by: SOCIAL WORKER

## 2025-07-15 PROCEDURE — 90837 PSYTX W PT 60 MINUTES: CPT | Mod: 95,,, | Performed by: SOCIAL WORKER

## 2025-07-15 NOTE — PROGRESS NOTES
"The patient location is: [place]  The chief complaint leading to consultation is: f/u OCD, anxiety    Visit type: audiovisual    Face to Face time with patient: 50 mins  60 minutes of total time spent on the encounter, which includes face to face time and non-face to face time preparing to see the patient (eg, review of tests), Obtaining and/or reviewing separately obtained history, Documenting clinical information in the electronic or other health record, Independently interpreting results (not separately reported) and communicating results to the patient/family/caregiver, or Care coordination (not separately reported).         Each patient to whom he or she provides medical services by telemedicine is:  (1) informed of the relationship between the physician and patient and the respective role of any other health care provider with respect to management of the patient; and (2) notified that he or she may decline to receive medical services by telemedicine and may withdraw from such care at any time.    Type of service: Individual    Content of session: Pt states he is "quite good", notes he traveled to [place] with family for a wedding, drove up there, notes the travel itself was very stressful, noted that his wife's mood "took a hit" during the travel. 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 Notes he is trying to focus on hobbies outside of work. Notes he continues to not drink, is working on managing OCD sx. Has been trying to be more social. Pt notes he is seeing is Dad for the first time in a few years next month, endorses some anxiety about that. Notes that he feels he is able to practice more acceptance with his father. 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 Notes increased anxiety shows up in his dreams and his quality of sleep and ability to fall asleep. Pt notes "the last big compulsion I have is thought looping" and his worry that he will forget something. " "Notes this causes him a lot of anxiety, "I have a really hard time with acceptance that sometimes I am going to forget things". Notes he doesn't catastrophize as much when it comes to other people. "I'm looking to the future and trying to keep things perfect to avoid repercussions". Discussed narcissistic nature of thinking he can be more efficient than others and how efficient this really is if it's taking over thoughts for the remainder of the day. Pt will reflect on this. Overall, pt doing well, anxiety and OCD both feel well controlled. Will f/u in 6 weeks.      Therapeutic techniques and approaches: behavior modification and supportive counseling. Rationale: appropriate for presenting issues.     Pt denies SI/HI. Mood was euthymic, affect matches verbal content. AAOx3, participated fully in session.     Time spent in counselinmins                             "

## 2025-08-26 ENCOUNTER — OFFICE VISIT (OUTPATIENT)
Dept: PSYCHIATRY | Facility: OTHER | Age: 35
End: 2025-08-26
Payer: COMMERCIAL

## 2025-08-26 DIAGNOSIS — F41.1 GAD (GENERALIZED ANXIETY DISORDER): Primary | ICD-10-CM

## 2025-08-26 PROCEDURE — 3044F HG A1C LEVEL LT 7.0%: CPT | Mod: CPTII,95,, | Performed by: SOCIAL WORKER

## 2025-08-26 PROCEDURE — 90837 PSYTX W PT 60 MINUTES: CPT | Mod: 95,,, | Performed by: SOCIAL WORKER
